# Patient Record
Sex: MALE | Employment: PART TIME | URBAN - METROPOLITAN AREA
[De-identification: names, ages, dates, MRNs, and addresses within clinical notes are randomized per-mention and may not be internally consistent; named-entity substitution may affect disease eponyms.]

---

## 2024-07-12 ENCOUNTER — APPOINTMENT (OUTPATIENT)
Dept: OTOLARYNGOLOGY | Facility: CLINIC | Age: 69
End: 2024-07-12
Payer: COMMERCIAL

## 2024-07-12 DIAGNOSIS — Z01.818 PREOPERATIVE CLEARANCE: ICD-10-CM

## 2024-07-12 DIAGNOSIS — H90.3 SENSORINEURAL HEARING LOSS (SNHL) OF BOTH EARS: Primary | ICD-10-CM

## 2024-07-12 DIAGNOSIS — H90.3 SENSORINEURAL HEARING LOSS, ASYMMETRICAL: ICD-10-CM

## 2024-07-12 PROCEDURE — 1126F AMNT PAIN NOTED NONE PRSNT: CPT | Performed by: OTOLARYNGOLOGY

## 2024-07-12 PROCEDURE — 99204 OFFICE O/P NEW MOD 45 MIN: CPT | Performed by: OTOLARYNGOLOGY

## 2024-07-12 PROCEDURE — 1159F MED LIST DOCD IN RCRD: CPT | Performed by: OTOLARYNGOLOGY

## 2024-07-12 PROCEDURE — 1160F RVW MEDS BY RX/DR IN RCRD: CPT | Performed by: OTOLARYNGOLOGY

## 2024-07-12 RX ORDER — CEFAZOLIN SODIUM 2 G/100ML
2 INJECTION, SOLUTION INTRAVENOUS ONCE
OUTPATIENT
Start: 2024-07-12 | End: 2024-07-12

## 2024-07-12 RX ORDER — SODIUM CHLORIDE 9 MG/ML
100 INJECTION, SOLUTION INTRAVENOUS CONTINUOUS
OUTPATIENT
Start: 2024-07-12

## 2024-07-12 ASSESSMENT — PATIENT HEALTH QUESTIONNAIRE - PHQ9
SUM OF ALL RESPONSES TO PHQ9 QUESTIONS 1 AND 2: 0
2. FEELING DOWN, DEPRESSED OR HOPELESS: NOT AT ALL
1. LITTLE INTEREST OR PLEASURE IN DOING THINGS: NOT AT ALL

## 2024-07-12 ASSESSMENT — PAIN SCALES - GENERAL: PAINLEVEL: 0-NO PAIN

## 2024-07-12 ASSESSMENT — COLUMBIA-SUICIDE SEVERITY RATING SCALE - C-SSRS: 1. IN THE PAST MONTH, HAVE YOU WISHED YOU WERE DEAD OR WISHED YOU COULD GO TO SLEEP AND NOT WAKE UP?: NO

## 2024-07-12 NOTE — PROGRESS NOTES
Reason for Consult:  Establish Care     Subjective   History Of Present Illness:  Giles Becerra is a 68 y.o. male with longstanding history of severe bilateral hearing loss since childhood. He was born as a premature baby and was placed in an incubator for 2 months. At around 6 years old, he was diagnosed with bilateral sensory hearing loss and was wearing hearing aids since.    For the last 10 years, he has had worsening difficulties with hearing and hearing aids. He is currently wearing BiCROS hearing aids. His right hearing declined to the point that hearing aids are no longer beneficial and his speech discrimination has significantly decreased even with the aids on.    He has a surgical history of open heart surgery.     He currently works as a .      Past Medical History:  He has no past medical history on file.    Surgical History:  He has no past surgical history on file.     Social History:  He reports that he has never smoked. He has never used smokeless tobacco. He reports that he does not drink alcohol and does not use drugs.    Family History:  family history is not on file.     Medications:  No current outpatient medications   Allergies:  Patient has no known allergies.    Review of Systems:   A comprehensive 10-point review of systems was obtained including constitutional, neurological, HEENT, pulmonary, cardiovascular, genito-urinary, and other pertinent systems and was negative except as noted in the HPI.     Objective   Physical Exam:  Last Recorded Vitals: There were no vitals taken for this visit.    On physical exam, the patient is a well-nourished, well-developed patient, in no acute distress, able to communicate without assistance in English language. Head and face is atraumatic and normocephalic. Salivary glands are intact. Facial strength is symmetrical bilaterally.       On ear examination:  Right ear: The patient has an open and patent ear canal. The tympanic  membrane is intact.    Left ear: The patient has an open and patent ear canal. The tympanic membrane is intact.    On vestibular exam, the patient has no spontaneous nystagmus, no headshake nystagmus, no head-thrust nystagmus, and no nystagmus on hyperventilation or Valsalva maneuvers. Heidy-Hallpike maneuver is negative bilaterally.       On neuro exam, the patient is alert and oriented x3, cranial nerves are grossly intact, cerebellar exam is normal.      The rest of the exam, including anterior rhinoscopy, oropharyngeal exam, neck exam, and cardiovascular exam, were normal including no palpable lymphadenopathies, thyroid in the midline position, normal pulses, and normal chest excursion.       Reviewed Results:  Audiology Testing:  I personally reviewed the CI evaluation from [facility] from  06/2024 that showed:   Right ear: 0% CNC in quiet, 0% AzBio in quiet.    Left ear: 28% CNC in quiet, 28% AzBio in quiet, 15% AzBio +10 SNR.                Imaging:  None    Procedure:  None    Assessment/Plan     1. Sensorineural hearing loss (SNHL) of both ears        In summary, Giles Becerra is a 68 y.o. male with history of open heart surgery and severe bilateral sensory hearing loss that is worse in right compared to left. Meets FDA criteria for cochlear plantation. He continues use of hearing aid in the left ear and would like to move forward only with the right.     The complications of right sided implantation was discussed with the patient and he elected to proceed. We will schedule this in the future. Prior to surgery he will need CT IAC and MRI to rule out retrocochlear pathology and immunization records.     Will follow-up in-clinic or virtually to review those records.         ____________________________________________________  Trever Velazquez MD  Professor and Chief   Otology/Neurotology/Lateral Skull-Base Surgery   [facility]     Scribe Attestation  By  signing my name below, I, Nirav Nguyen, Scribe   attest that this documentation has been prepared under the direction and in the presence of Trever Villegas MD.

## 2024-07-12 NOTE — LETTER
July 16, 2024     Cory Hoffman MD  [address] [facility] [id number]    Patient: Giles Becerra   YOB: 1955   Date of Visit: 7/12/2024       Dear Dr. Cory Hoffman MD:    Thank you for referring Giles Becerra to me for evaluation. Below are my notes for this consultation.  If you have questions, please do not hesitate to call me. I look forward to following your patient along with you.       Sincerely,     Trever Villegas MD      CC: Roslyn Bhandari, Melquiades  ______________________________________________________________________________________            Reason for Consult:  Establish Care     Subjective  History Of Present Illness:  Giles Becerra is a 68 y.o. male with longstanding history of severe bilateral hearing loss since childhood. He was born as a premature baby and was placed in an incubator for 2 months. At around 6 years old, he was diagnosed with bilateral sensory hearing loss and was wearing hearing aids since.    For the last 10 years, he has had worsening difficulties with hearing and hearing aids. He is currently wearing BiCROS hearing aids. His right hearing declined to the point that hearing aids are no longer beneficial and his speech discrimination has significantly decreased even with the aids on.    He has a surgical history of open heart surgery.     He currently works as a .      Past Medical History:  He has no past medical history on file.    Surgical History:  He has no past surgical history on file.     Social History:  He reports that he has never smoked. He has never used smokeless tobacco. He reports that he does not drink alcohol and does not use drugs.    Family History:  family history is not on file.     Medications:  No current outpatient medications   Allergies:  Patient has no known allergies.    Review of Systems:   A comprehensive 10-point review of systems was obtained including constitutional, neurological,  HEENT, pulmonary, cardiovascular, genito-urinary, and other pertinent systems and was negative except as noted in the HPI.     Objective  Physical Exam:  Last Recorded Vitals: There were no vitals taken for this visit.    On physical exam, the patient is a well-nourished, well-developed patient, in no acute distress, able to communicate without assistance in English language. Head and face is atraumatic and normocephalic. Salivary glands are intact. Facial strength is symmetrical bilaterally.       On ear examination:  Right ear: The patient has an open and patent ear canal. The tympanic membrane is intact.    Left ear: The patient has an open and patent ear canal. The tympanic membrane is intact.    On vestibular exam, the patient has no spontaneous nystagmus, no headshake nystagmus, no head-thrust nystagmus, and no nystagmus on hyperventilation or Valsalva maneuvers. Heidy-Hallpike maneuver is negative bilaterally.       On neuro exam, the patient is alert and oriented x3, cranial nerves are grossly intact, cerebellar exam is normal.      The rest of the exam, including anterior rhinoscopy, oropharyngeal exam, neck exam, and cardiovascular exam, were normal including no palpable lymphadenopathies, thyroid in the midline position, normal pulses, and normal chest excursion.       Reviewed Results:  Audiology Testing:  I personally reviewed the CI evaluation from [facility] from  06/2024 that showed:   Right ear: 0% CNC in quiet, 0% AzBio in quiet.    Left ear: 28% CNC in quiet, 28% AzBio in quiet, 15% AzBio +10 SNR.                Imaging:  None    Procedure:  None    Assessment/Plan    1. Sensorineural hearing loss (SNHL) of both ears        In summary, Giles Becerra is a 68 y.o. male with history of open heart surgery and severe bilateral sensory hearing loss that is worse in right compared to left. Meets FDA criteria for cochlear plantation. He continues use of hearing aid in the left ear and  would like to move forward only with the right.     The complications of right sided implantation was discussed with the patient and he elected to proceed. We will schedule this in the future. Prior to surgery he will need CT IAC and MRI to rule out retrocochlear pathology and immunization records.     Will follow-up in-clinic or virtually to review those records.         ____________________________________________________  Trever Velazquez MD  Professor and Chief   Otology/Neurotology/Lateral Skull-Base Surgery   [facility]     Scribe Attestation  By signing my name below, I, Nirav Nguyen, Scribe   attest that this documentation has been prepared under the direction and in the presence of Trever Villegas MD.

## 2024-07-26 ENCOUNTER — HOSPITAL ENCOUNTER (OUTPATIENT)
Dept: RADIOLOGY | Facility: CLINIC | Age: 69
Discharge: HOME | End: 2024-07-26
Payer: COMMERCIAL

## 2024-07-26 DIAGNOSIS — H90.3 SENSORINEURAL HEARING LOSS, ASYMMETRICAL: ICD-10-CM

## 2024-07-26 PROCEDURE — 70480 CT ORBIT/EAR/FOSSA W/O DYE: CPT

## 2024-07-30 ENCOUNTER — HOSPITAL ENCOUNTER (OUTPATIENT)
Dept: RADIOLOGY | Facility: CLINIC | Age: 69
Discharge: HOME | End: 2024-07-30
Payer: COMMERCIAL

## 2024-07-30 DIAGNOSIS — H90.3 SENSORINEURAL HEARING LOSS, ASYMMETRICAL: ICD-10-CM

## 2024-07-30 PROCEDURE — A9575 INJ GADOTERATE MEGLUMI 0.1ML: HCPCS | Performed by: OTOLARYNGOLOGY

## 2024-07-30 PROCEDURE — 70553 MRI BRAIN STEM W/O & W/DYE: CPT | Performed by: RADIOLOGY

## 2024-07-30 PROCEDURE — 70553 MRI BRAIN STEM W/O & W/DYE: CPT

## 2024-07-30 PROCEDURE — 2550000001 HC RX 255 CONTRASTS: Performed by: OTOLARYNGOLOGY

## 2024-07-30 RX ORDER — GADOTERATE MEGLUMINE 376.9 MG/ML
0.2 INJECTION INTRAVENOUS
Status: COMPLETED | OUTPATIENT
Start: 2024-07-30 | End: 2024-07-30

## 2024-08-02 ENCOUNTER — PATIENT MESSAGE (OUTPATIENT)
Dept: OTOLARYNGOLOGY | Facility: HOSPITAL | Age: 69
End: 2024-08-02
Payer: COMMERCIAL

## 2024-08-06 ENCOUNTER — EVALUATION (OUTPATIENT)
Dept: SPEECH THERAPY | Facility: CLINIC | Age: 69
End: 2024-08-06
Payer: COMMERCIAL

## 2024-08-06 DIAGNOSIS — H90.3 SENSORINEURAL HEARING LOSS (SNHL) OF BOTH EARS: Primary | ICD-10-CM

## 2024-08-06 DIAGNOSIS — R48.8 OTHER SYMBOLIC DYSFUNCTIONS: ICD-10-CM

## 2024-08-06 DIAGNOSIS — R41.841 COGNITIVE COMMUNICATION DEFICIT: ICD-10-CM

## 2024-08-06 DIAGNOSIS — H90.3 SENSORINEURAL HEARING LOSS, ASYMMETRICAL: ICD-10-CM

## 2024-08-06 DIAGNOSIS — Z01.818 PREOPERATIVE CLEARANCE: ICD-10-CM

## 2024-08-06 PROCEDURE — 92523 SPEECH SOUND LANG COMPREHEN: CPT | Mod: GN

## 2024-08-06 ASSESSMENT — PAIN - FUNCTIONAL ASSESSMENT: PAIN_FUNCTIONAL_ASSESSMENT: 0-10

## 2024-08-06 ASSESSMENT — PAIN SCALES - GENERAL: PAINLEVEL_OUTOF10: 0 - NO PAIN

## 2024-08-06 NOTE — PROGRESS NOTES
Speech-Language Pathology    Auditory Evaluation      Patient Name: Giles Becerra  MRN: [id number]  Today's Date: 8/6/2024     Time Calculation  Start Time: 0900  Stop Time: 1055  Time Calculation (min): 115 min      Current Problem:  Patient Active Problem List   Diagnosis    Sensorineural hearing loss (SNHL) of both ears    Other symbolic dysfunctions    Cognitive communication deficit        Recommendations and Impressions:    NORMAL OVERALL COGNITIVE ASSESSMENT    Recommendations: Aural Rehab 1-4 weeks S/P Cochlear Implant Activation    Resonance-Voice Assessment:  Assessments Used: Informal  Articulation Screening: Age appropriate  Nasal Resonance: Mild hypernasality  Nasal Air Emissions: Not present  Voice: Normal  Speech Inteligibility: Normal    Subjective   General Visit Information:  Recommendations: Aural Rehab 1-4 weeks S/P Cochlear Implant Activation  Living Environment: Home  Language at home: Spoken English, ASL   Present: No  Arrival: Independent  Reason for Referral: Cognitive assessment as part of cochlear implant candidacy determination    Provider:  Audiology: Dr. Roslyn Bhandari; [facility] in [place] (formally [facility])  ENT: Dr. Velazquez    Pain:  Pain Assessment  Pain Assessment: 0-10  0-10 (Numeric) Pain Score: 0 - No pain    Objective     Baseline Observations:  Hearing Loss: Since Birth  Current Amplification: Worn during evaluation    Recall Dr. Velazquez on 7/12/24:    Giles Becerra is a 68 y.o. male with longstanding history of severe bilateral hearing loss since childhood. He was born as a premature baby and was placed in an incubator for 2 months. At around 6 years old, he was diagnosed with bilateral sensory hearing loss and was wearing hearing aids since.     For the last 10 years, he has had worsening difficulties with hearing and hearing aids. He is currently wearing BiCROS hearing aids. His right hearing declined to the point that hearing aids are  no longer beneficial and his speech discrimination has significantly decreased even with the aids on.     He has a surgical history of open heart surgery.     He currently works as a .      Cognition Skill Assessment:  CLQT: Personal Facts Score: 8  CLQT: Personal Facts Function: WFL  CLQT: Symbol Cancellation Score: 12  CLQT: Symbol Cancellation Function: WFL  CLQT: Confrontational Naming Score: 10  CLQT: Confrontational Naming Function: WFL  CLQT: Clock Drawing Score: 13  CLQT: Clock Drawing Function: WFL  CLQT: Story Retelling Score: 9  CLQT: Story Retelling Function: WFL  CLQT: Symbol Trails Score: 10  CLQT: Symbol Trails Function: WFL  CLQT: Generative Naming Score: 6  CLQT: Generative Naming Function: WFL  CLQT: Design Memory Score: 5  CLQT: Design Memory Function: WFL  CLQT: Mazes Score: 5  CLQT: Mazes Function: Below Cutoff  CLQT: Design Generation Score: 5  CLQT: Design Generation Function: Below Cutoff  CLQT: Auditory Comprehension Score: 18  CLQT: Auditory Comprehension Function: WFL    SRCD - Severity Rating Cognitive Domains:  SRCD: Attention Score: 191  SRCD: Attention Ratin  SRCD: Attention Function: WFL  SRCD: Memory Score: 166  SRCD: Memory Ratin  SRCD: Memory Function: WFL  SRCD: Executive Functions Score: 26  SRCD: Executive Functions Ratin  SRCD: Executive Functions Function: WFL  SRCD: Language Score: 33  SRCD: Language Ratin  SRCD: Language Function: WFL  SRCD: Visuospatial Skills Score: 84  SRCD: Visuospatial Skills Ratin  SRCD:Visuospatial Skills Function: WFL  SRCD: Non-Linguistic Cognition Score: 37  SRCD: Non-Linguistic Cognition Rating: 3  SRCD: Non-Linguistic Cognition Function: Mild  SRCD: Linguistic Cognition Score: 51  SRCD: Linguistic Cognition Function: WFL  SRCD: Clock Drawing Severity Rating Score: 13  SRCD: Clock Drawing Severity Rating Rate: 4  SRCD: Clock Drawing Severity Rating Function: WFL  SRCD: Generative Naming Perseveration Ratio  Score: 0.7  SRCD: Generative Naming Perseveration Ratio Function: WFL  SRCD: Composite Severity Score Rating Function: 4  SRCD: Composite Severity Score Rating Function: WFL    Auditory Education:  Treatment Performed Today: No  Individual(s) Educated: Patient  Verbal Education Provided: Risks/Benefits of Therapy, Aural Rehabilitation s/p cochlear implant activation  Response to Educaton: Verbalized Understanding, Patient/caregiver asked appropriate questions  Patient's Learning Preference(s): Demonstration, Printed Materials, Explanation/Discussion  Patient/caregiver verbalized understanding and agreement.: Yes

## 2024-08-09 ENCOUNTER — APPOINTMENT (OUTPATIENT)
Dept: OTOLARYNGOLOGY | Facility: CLINIC | Age: 69
End: 2024-08-09
Payer: COMMERCIAL

## 2024-08-09 VITALS — WEIGHT: 217 LBS | BODY MASS INDEX: 30.38 KG/M2 | HEIGHT: 71 IN

## 2024-08-09 DIAGNOSIS — H90.3 SENSORINEURAL HEARING LOSS (SNHL) OF BOTH EARS: Primary | ICD-10-CM

## 2024-08-09 DIAGNOSIS — H93.293 IMPAIRMENT OF AUDITORY DISCRIMINATION OF BOTH EARS: ICD-10-CM

## 2024-08-09 PROCEDURE — 3008F BODY MASS INDEX DOCD: CPT | Performed by: OTOLARYNGOLOGY

## 2024-08-09 PROCEDURE — 99214 OFFICE O/P EST MOD 30 MIN: CPT | Performed by: OTOLARYNGOLOGY

## 2024-08-09 PROCEDURE — 1159F MED LIST DOCD IN RCRD: CPT | Performed by: OTOLARYNGOLOGY

## 2024-08-09 PROCEDURE — 1160F RVW MEDS BY RX/DR IN RCRD: CPT | Performed by: OTOLARYNGOLOGY

## 2024-08-09 RX ORDER — ASPIRIN 81 MG/1
81 TABLET ORAL DAILY
COMMUNITY

## 2024-08-09 RX ORDER — ATORVASTATIN CALCIUM 40 MG/1
TABLET, FILM COATED ORAL
COMMUNITY
Start: 2024-08-02

## 2024-08-09 RX ORDER — NITROGLYCERIN 0.4 MG/1
TABLET SUBLINGUAL
COMMUNITY

## 2024-08-09 RX ORDER — LEVOTHYROXINE SODIUM 112 UG/1
TABLET ORAL
COMMUNITY

## 2024-08-09 RX ORDER — CARVEDILOL 6.25 MG/1
TABLET ORAL
COMMUNITY

## 2024-08-09 RX ORDER — TAMSULOSIN HYDROCHLORIDE 0.4 MG/1
CAPSULE ORAL
COMMUNITY

## 2024-08-09 RX ORDER — BENAZEPRIL HYDROCHLORIDE 40 MG/1
TABLET ORAL
COMMUNITY

## 2024-08-09 RX ORDER — AMLODIPINE BESYLATE 10 MG/1
TABLET ORAL
COMMUNITY

## 2024-08-09 NOTE — H&P (VIEW-ONLY)
"        Reason for Consult:  Follow-up     Subjective   History Of Present Illness:  Giles Becerra is a 68 y.o. male with history of open heart surgery and severe bilateral sensory hearing loss that is worse in right compared to left. Meets FDA criteria for cochlear plantation. He continues use of hearing aid in the left ear and would like to move forward only with the right.     Since his last visit, he had a MRI showing no evidence of retrocochlear pathology. His CT showed his facial nerve in good position. He had his pneumococcal vaccine and cognitive evaluation. He was cleared by cardiology for surgery. He is here for his final visit prior to surgery.      Past Medical History:  He has a past medical history of HL (hearing loss) (1955).    Surgical History:  He has a past surgical history that includes Tonsillectomy (01/15/1965).     Social History:  He reports that he has never smoked. He has never used smokeless tobacco. He reports that he does not drink alcohol and does not use drugs.    Family History:  family history is not on file.     Medications:  Current Outpatient Medications   Medication Instructions    amLODIPine (Norvasc) 10 mg tablet     aspirin 81 mg, oral, Daily    atorvastatin (Lipitor) 40 mg tablet     benazepril (Lotensin) 40 mg tablet     carvedilol (Coreg) 6.25 mg tablet     levothyroxine (Synthroid, Levoxyl) 112 mcg tablet oral    nitroglycerin (Nitrostat) 0.4 mg SL tablet     tamsulosin (Flomax) 0.4 mg 24 hr capsule       Allergies:  Patient has no known allergies.    Review of Systems:   A comprehensive 10-point review of systems was obtained including constitutional, neurological, HEENT, pulmonary, cardiovascular, genito-urinary, and other pertinent systems and was negative except as noted in the HPI.     Objective   Physical Exam:  Last Recorded Vitals: Height 1.791 m (5' 10.5\"), weight 98.4 kg (217 lb).    On physical exam, the patient is a well-nourished, well-developed " patient, in no acute distress, able to communicate without assistance in English language. Head and face is atraumatic and normocephalic. Salivary glands are intact. Facial strength is symmetrical bilaterally.       On ear examination:  Right ear: The patient has an open and patent ear canal. The tympanic membrane is intact.    Left ear: The patient has an open and patent ear canal. The tympanic membrane is intact.    On vestibular exam, the patient has no spontaneous nystagmus, no headshake nystagmus, no head-thrust nystagmus, and no nystagmus on hyperventilation or Valsalva maneuvers. Heidy-Hallpike maneuver is negative bilaterally.       On neuro exam, the patient is alert and oriented x3, cranial nerves are grossly intact, cerebellar exam is normal.      The rest of the exam, including anterior rhinoscopy, oropharyngeal exam, neck exam, and cardiovascular exam, were normal including no palpable lymphadenopathies, thyroid in the midline position, normal pulses, and normal chest excursion.       Reviewed Results:  Audiology Testing:  I personally reviewed the CI evaluation from [facility] from  06/2024 that showed:   Right ear: 0% CNC in quiet, 0% AzBio in quiet.    Left ear: 28% CNC in quiet, 28% AzBio in quiet, 15% AzBio +10 SNR.                Imaging:  I personally reviewed his CT IAC from 07/2024:  Patent cochlea bilaterally and normal positioning of the facial nerve    I personally reviewed his MRI IAC from 07/2024:  No retrocochlear pathology.     Procedure:  None    Assessment/Plan     1. Sensorineural hearing loss (SNHL) of both ears    2. Impairment of auditory discrimination of both ears        In summary, Giles Becerra is a 68 y.o. male with history of open heart surgery and severe bilateral sensory hearing loss that is worse in right compared to left. Meets FDA criteria for cochlear plantation. He continues use of hearing aid in the left ear and would like to move forward with the  right.     He had a CT, MRI, cognitive evaluation, pneumococcal vaccine, and cardiology clearance.     The risks, indications, and complications of a right-sided cochlear implant were discussed with the patient. He is scheduled for this on 08/30/2024.      Scribe Attestation  By signing my name below, I, Nadja Cruzito , Justin attest that this documentation has been prepared under the direction and in the presence of Trever Villegas MD.    ____________________________________________________  Trever Velazquez MD  Professor and Chief   Otology/Neurotology/Lateral Skull-Base Surgery   [facility]

## 2024-08-09 NOTE — LETTER
August 10, 2024     Melquiades Phillips    Patient: Giles Becerra   YOB: 1955   Date of Visit: 8/9/2024       Dear Melquiades Avina:    Thank you for referring Giles Becerra to me for evaluation. Below are my notes for this consultation.  If you have questions, please do not hesitate to call me. I look forward to following your patient along with you.       Sincerely,     Trever Villegas MD      CC: Melquiades Guadalupe MD  ______________________________________________________________________________________            Reason for Consult:  Follow-up     Subjective  History Of Present Illness:  Giles Becerra is a 68 y.o. male with history of open heart surgery and severe bilateral sensory hearing loss that is worse in right compared to left. Meets FDA criteria for cochlear plantation. He continues use of hearing aid in the left ear and would like to move forward only with the right.     Since his last visit, he had a MRI showing no evidence of retrocochlear pathology. His CT showed his facial nerve in good position. He had his pneumococcal vaccine and cognitive evaluation. He was cleared by cardiology for surgery. He is here for his final visit prior to surgery.      Past Medical History:  He has a past medical history of HL (hearing loss) (1955).    Surgical History:  He has a past surgical history that includes Tonsillectomy (01/15/1965).     Social History:  He reports that he has never smoked. He has never used smokeless tobacco. He reports that he does not drink alcohol and does not use drugs.    Family History:  family history is not on file.     Medications:  Current Outpatient Medications   Medication Instructions   • amLODIPine (Norvasc) 10 mg tablet    • aspirin 81 mg, oral, Daily   • atorvastatin (Lipitor) 40 mg tablet    • benazepril (Lotensin) 40 mg tablet    • carvedilol (Coreg) 6.25 mg tablet    • levothyroxine (Synthroid, Levoxyl) 112 mcg  "tablet oral   • nitroglycerin (Nitrostat) 0.4 mg SL tablet    • tamsulosin (Flomax) 0.4 mg 24 hr capsule       Allergies:  Patient has no known allergies.    Review of Systems:   A comprehensive 10-point review of systems was obtained including constitutional, neurological, HEENT, pulmonary, cardiovascular, genito-urinary, and other pertinent systems and was negative except as noted in the HPI.     Objective  Physical Exam:  Last Recorded Vitals: Height 1.791 m (5' 10.5\"), weight 98.4 kg (217 lb).    On physical exam, the patient is a well-nourished, well-developed patient, in no acute distress, able to communicate without assistance in English language. Head and face is atraumatic and normocephalic. Salivary glands are intact. Facial strength is symmetrical bilaterally.       On ear examination:  Right ear: The patient has an open and patent ear canal. The tympanic membrane is intact.    Left ear: The patient has an open and patent ear canal. The tympanic membrane is intact.    On vestibular exam, the patient has no spontaneous nystagmus, no headshake nystagmus, no head-thrust nystagmus, and no nystagmus on hyperventilation or Valsalva maneuvers. Skandia-Hallpike maneuver is negative bilaterally.       On neuro exam, the patient is alert and oriented x3, cranial nerves are grossly intact, cerebellar exam is normal.      The rest of the exam, including anterior rhinoscopy, oropharyngeal exam, neck exam, and cardiovascular exam, were normal including no palpable lymphadenopathies, thyroid in the midline position, normal pulses, and normal chest excursion.       Reviewed Results:  Audiology Testing:  I personally reviewed the CI evaluation from [facility] from  06/2024 that showed:   Right ear: 0% CNC in quiet, 0% AzBio in quiet.    Left ear: 28% CNC in quiet, 28% AzBio in quiet, 15% AzBio +10 SNR.                Imaging:  I personally reviewed his CT IAC from 07/2024:  Patent cochlea bilaterally and normal " positioning of the facial nerve    I personally reviewed his MRI IAC from 07/2024:  No retrocochlear pathology.     Procedure:  None    Assessment/Plan    1. Sensorineural hearing loss (SNHL) of both ears    2. Impairment of auditory discrimination of both ears        In summary, Giles Becerra is a 68 y.o. male with history of open heart surgery and severe bilateral sensory hearing loss that is worse in right compared to left. Meets FDA criteria for cochlear plantation. He continues use of hearing aid in the left ear and would like to move forward with the right.     He had a CT, MRI, cognitive evaluation, pneumococcal vaccine, and cardiology clearance.     The risks, indications, and complications of a right-sided cochlear implant were discussed with the patient. He is scheduled for this on 08/30/2024.      Scribe Attestation  By signing my name below, INadja , Greciaibe attest that this documentation has been prepared under the direction and in the presence of Trever Villegas MD.    ____________________________________________________  Trever Velazquez MD  Professor and Chief   Otology/Neurotology/Lateral Skull-Base Surgery   [facility]

## 2024-08-09 NOTE — PROGRESS NOTES
"        Reason for Consult:  Follow-up     Subjective   History Of Present Illness:  Giles Becerra is a 68 y.o. male with history of open heart surgery and severe bilateral sensory hearing loss that is worse in right compared to left. Meets FDA criteria for cochlear plantation. He continues use of hearing aid in the left ear and would like to move forward only with the right.     Since his last visit, he had a MRI showing no evidence of retrocochlear pathology. His CT showed his facial nerve in good position. He had his pneumococcal vaccine and cognitive evaluation. He was cleared by cardiology for surgery. He is here for his final visit prior to surgery.      Past Medical History:  He has a past medical history of HL (hearing loss) (1955).    Surgical History:  He has a past surgical history that includes Tonsillectomy (01/15/1965).     Social History:  He reports that he has never smoked. He has never used smokeless tobacco. He reports that he does not drink alcohol and does not use drugs.    Family History:  family history is not on file.     Medications:  Current Outpatient Medications   Medication Instructions    amLODIPine (Norvasc) 10 mg tablet     aspirin 81 mg, oral, Daily    atorvastatin (Lipitor) 40 mg tablet     benazepril (Lotensin) 40 mg tablet     carvedilol (Coreg) 6.25 mg tablet     levothyroxine (Synthroid, Levoxyl) 112 mcg tablet oral    nitroglycerin (Nitrostat) 0.4 mg SL tablet     tamsulosin (Flomax) 0.4 mg 24 hr capsule       Allergies:  Patient has no known allergies.    Review of Systems:   A comprehensive 10-point review of systems was obtained including constitutional, neurological, HEENT, pulmonary, cardiovascular, genito-urinary, and other pertinent systems and was negative except as noted in the HPI.     Objective   Physical Exam:  Last Recorded Vitals: Height 1.791 m (5' 10.5\"), weight 98.4 kg (217 lb).    On physical exam, the patient is a well-nourished, well-developed " patient, in no acute distress, able to communicate without assistance in English language. Head and face is atraumatic and normocephalic. Salivary glands are intact. Facial strength is symmetrical bilaterally.       On ear examination:  Right ear: The patient has an open and patent ear canal. The tympanic membrane is intact.    Left ear: The patient has an open and patent ear canal. The tympanic membrane is intact.    On vestibular exam, the patient has no spontaneous nystagmus, no headshake nystagmus, no head-thrust nystagmus, and no nystagmus on hyperventilation or Valsalva maneuvers. Heidy-Hallpike maneuver is negative bilaterally.       On neuro exam, the patient is alert and oriented x3, cranial nerves are grossly intact, cerebellar exam is normal.      The rest of the exam, including anterior rhinoscopy, oropharyngeal exam, neck exam, and cardiovascular exam, were normal including no palpable lymphadenopathies, thyroid in the midline position, normal pulses, and normal chest excursion.       Reviewed Results:  Audiology Testing:  I personally reviewed the CI evaluation from [facility] from  06/2024 that showed:   Right ear: 0% CNC in quiet, 0% [employer] in quiet.    Left ear: 28% CNC in quiet, 28% Az[employer] in quiet, 15% [employer] +10 SNR.                Imaging:  I personally reviewed his CT IAC from 07/2024:  Patent cochlea bilaterally and normal positioning of the facial nerve    I personally reviewed his MRI IAC from 07/2024:  No retrocochlear pathology.     Procedure:  None    Assessment/Plan     1. Sensorineural hearing loss (SNHL) of both ears    2. Impairment of auditory discrimination of both ears        In summary, Giles Becerra is a 68 y.o. male with history of open heart surgery and severe bilateral sensory hearing loss that is worse in right compared to left. Meets FDA criteria for cochlear plantation. He continues use of hearing aid in the left ear and would like to move forward with the  right.     He had a CT, MRI, cognitive evaluation, pneumococcal vaccine, and cardiology clearance.     The risks, indications, and complications of a right-sided cochlear implant were discussed with the patient. He is scheduled for this on 08/30/2024.      Scribe Attestation  By signing my name below, I, Nadja Cruzito , Justin attest that this documentation has been prepared under the direction and in the presence of Trever Villegas MD.    ____________________________________________________  Trever Velazquez MD  Professor and Chief   Otology/Neurotology/Lateral Skull-Base Surgery   [facility]

## 2024-08-10 PROBLEM — H93.293 IMPAIRMENT OF AUDITORY DISCRIMINATION OF BOTH EARS: Status: ACTIVE | Noted: 2024-08-10

## 2024-08-14 ENCOUNTER — PATIENT MESSAGE (OUTPATIENT)
Dept: OTOLARYNGOLOGY | Facility: CLINIC | Age: 69
End: 2024-08-14
Payer: COMMERCIAL

## 2024-08-19 ENCOUNTER — PRE-ADMISSION TESTING (OUTPATIENT)
Dept: PREADMISSION TESTING | Facility: HOSPITAL | Age: 69
End: 2024-08-19
Payer: COMMERCIAL

## 2024-08-19 VITALS
HEIGHT: 70 IN | BODY MASS INDEX: 31.64 KG/M2 | RESPIRATION RATE: 18 BRPM | DIASTOLIC BLOOD PRESSURE: 86 MMHG | WEIGHT: 221 LBS | TEMPERATURE: 98.2 F | SYSTOLIC BLOOD PRESSURE: 150 MMHG | HEART RATE: 60 BPM

## 2024-08-19 DIAGNOSIS — H90.3 SENSORINEURAL HEARING LOSS, ASYMMETRICAL: Primary | ICD-10-CM

## 2024-08-19 DIAGNOSIS — Z01.818 PREOPERATIVE CLEARANCE: ICD-10-CM

## 2024-08-19 LAB
ANION GAP SERPL CALC-SCNC: 13 MMOL/L (ref 10–20)
BUN SERPL-MCNC: 13 MG/DL (ref 6–23)
CALCIUM SERPL-MCNC: 10 MG/DL (ref 8.6–10.6)
CHLORIDE SERPL-SCNC: 106 MMOL/L (ref 98–107)
CO2 SERPL-SCNC: 26 MMOL/L (ref 21–32)
CREAT SERPL-MCNC: 1.13 MG/DL (ref 0.5–1.3)
EGFRCR SERPLBLD CKD-EPI 2021: 71 ML/MIN/1.73M*2
ERYTHROCYTE [DISTWIDTH] IN BLOOD BY AUTOMATED COUNT: 13.8 % (ref 11.5–14.5)
GLUCOSE SERPL-MCNC: 93 MG/DL (ref 74–99)
HCT VFR BLD AUTO: 44.3 % (ref 41–52)
HGB BLD-MCNC: 14.9 G/DL (ref 13.5–17.5)
MCH RBC QN AUTO: 28.9 PG (ref 26–34)
MCHC RBC AUTO-ENTMCNC: 33.6 G/DL (ref 32–36)
MCV RBC AUTO: 86 FL (ref 80–100)
NRBC BLD-RTO: 0 /100 WBCS (ref 0–0)
PLATELET # BLD AUTO: 238 X10*3/UL (ref 150–450)
POTASSIUM SERPL-SCNC: 3.4 MMOL/L (ref 3.5–5.3)
RBC # BLD AUTO: 5.16 X10*6/UL (ref 4.5–5.9)
SODIUM SERPL-SCNC: 142 MMOL/L (ref 136–145)
WBC # BLD AUTO: 8 X10*3/UL (ref 4.4–11.3)

## 2024-08-19 PROCEDURE — 99204 OFFICE O/P NEW MOD 45 MIN: CPT | Performed by: NURSE PRACTITIONER

## 2024-08-19 PROCEDURE — 85027 COMPLETE CBC AUTOMATED: CPT

## 2024-08-19 PROCEDURE — 82374 ASSAY BLOOD CARBON DIOXIDE: CPT

## 2024-08-19 PROCEDURE — 87081 CULTURE SCREEN ONLY: CPT

## 2024-08-19 PROCEDURE — 36415 COLL VENOUS BLD VENIPUNCTURE: CPT

## 2024-08-19 ASSESSMENT — DUKE ACTIVITY SCORE INDEX (DASI)
CAN YOU PARTICIPATE IN MODERATE RECREATIONAL ACTIVITIES LIKE GOLF, BOWLING, DANCING, DOUBLES TENNIS OR THROWING A BASEBALL OR FOOTBALL: YES
DASI METS SCORE: 9.9
CAN YOU RUN A SHORT DISTANCE: YES
CAN YOU DO YARD WORK LIKE RAKING LEAVES, WEEDING OR PUSHING A MOWER: YES
CAN YOU WALK INDOORS, SUCH AS AROUND YOUR HOUSE: YES
CAN YOU CLIMB A FLIGHT OF STAIRS OR WALK UP A HILL: YES
TOTAL_SCORE: 58.2
CAN YOU TAKE CARE OF YOURSELF (EAT, DRESS, BATHE, OR USE TOILET): YES
CAN YOU DO MODERATE WORK AROUND THE HOUSE LIKE VACUUMING, SWEEPING FLOORS OR CARRYING GROCERIES: YES
CAN YOU DO HEAVY WORK AROUND THE HOUSE LIKE SCRUBBING FLOORS OR LIFTING AND MOVING HEAVY FURNITURE: YES
CAN YOU PARTICIPATE IN STRENOUS SPORTS LIKE SWIMMING, SINGLES TENNIS, FOOTBALL, BASKETBALL, OR SKIING: YES
CAN YOU DO LIGHT WORK AROUND THE HOUSE LIKE DUSTING OR WASHING DISHES: YES
CAN YOU WALK A BLOCK OR TWO ON LEVEL GROUND: YES
CAN YOU HAVE SEXUAL RELATIONS: YES

## 2024-08-19 ASSESSMENT — ENCOUNTER SYMPTOMS
ENDOCRINE NEGATIVE: 1
EYES NEGATIVE: 1
CARDIOVASCULAR NEGATIVE: 1
GASTROINTESTINAL NEGATIVE: 1
NEUROLOGICAL NEGATIVE: 1
RESPIRATORY NEGATIVE: 1
CONSTITUTIONAL NEGATIVE: 1
MUSCULOSKELETAL NEGATIVE: 1
NECK NEGATIVE: 1

## 2024-08-19 ASSESSMENT — LIFESTYLE VARIABLES: SMOKING_STATUS: NONSMOKER

## 2024-08-19 NOTE — CPM/PAT H&P
CPM/PAT Evaluation       Name: Giles Beecrra (Giles Becerra)  /Age: 1955/68 y.o.     Visit Type:   In-Person       Chief Complaint: Sensorineural hearing loss (SNHL) of both ears     HPI  Pt is a 68 year old male with a PMHx significant for COPD, BPH, BENJA, diastolic heart failure, bilateral sensorineural hearing loss and CAD s/p CABG.  Patient is being evaluated in CPM in anticipation of insertion of Right Cochlear Implant with Dr. Marcus Villegas on 24.  Past Medical History:   Diagnosis Date    Arthritis     Coronary artery disease     HL (hearing loss) 1955    Hyperlipidemia     Hypertension     Hypothyroidism        Past Surgical History:   Procedure Laterality Date    CORONARY ARTERY BYPASS GRAFT      FINGER SURGERY      TONSILLECTOMY  01/15/1965       Patient  reports being sexually active and has had partner(s) who are female. He reports using the following method of birth control/protection: Condom Male.    Family History   Family history unknown: Yes       No Known Allergies    Prior to Admission medications    Medication Sig Start Date End Date Taking? Authorizing Provider   amLODIPine (Norvasc) 10 mg tablet    Yes Historical Provider, MD   aspirin 81 mg EC tablet Take 1 tablet (81 mg) by mouth once daily.   Yes Historical Provider, MD   atorvastatin (Lipitor) 40 mg tablet  24  Yes Historical Provider, MD   benazepril (Lotensin) 40 mg tablet    Yes Historical Provider, MD   carvedilol (Coreg) 6.25 mg tablet    Yes Historical Provider, MD   levothyroxine (Synthroid, Levoxyl) 112 mcg tablet Take by mouth.   Yes Historical Provider, MD   nitroglycerin (Nitrostat) 0.4 mg SL tablet    Yes Historical Provider, MD   tamsulosin (Flomax) 0.4 mg 24 hr capsule    Yes Historical Provider, MD DAN ROS:   Constitutional:   neg    Neuro/Psych:   neg    Eyes:   neg    Ears:    hearing loss   hearing aides  Nose:   neg    Mouth:   neg    Throat:   neg    Neck:   neg    Cardio:   neg     Respiratory:   neg    Endocrine:   neg    GI:   neg    :   neg    Musculoskeletal:   neg    Hematologic:   neg    Skin:  neg        Physical Exam  Vitals reviewed.   Constitutional:       Appearance: Normal appearance.   HENT:      Head: Normocephalic and atraumatic.   Cardiovascular:      Rate and Rhythm: Normal rate and regular rhythm.      Pulses: Normal pulses.      Heart sounds: Normal heart sounds.   Pulmonary:      Effort: Pulmonary effort is normal.      Breath sounds: Normal breath sounds.   Abdominal:      Palpations: Abdomen is soft.   Musculoskeletal:         General: Normal range of motion.      Cervical back: Normal range of motion.   Skin:     General: Skin is warm.   Neurological:      General: No focal deficit present.      Mental Status: He is alert and oriented to person, place, and time.   Psychiatric:         Mood and Affect: Mood normal.         Behavior: Behavior normal.          PAT AIRWAY:   Airway:     Mallampati::  II    TM distance::  >3 FB    Neck ROM::  Full  normal        Visit Vitals  /86   Pulse 60   Temp 36.8 °C (98.2 °F)   Resp 18       DASI Risk Score      Flowsheet Row Most Recent Value   DASI SCORE 58.2   METS Score (Will be calculated only when all the questions are answered) 9.9          Caprini DVT Assessment      Flowsheet Row Most Recent Value   DVT Score 9   Current Status Major surgery planned, lasting 2-3 hours   History Prior major surgery   Age 60-75 years   BMI 31-40 (Obesity)          Modified Frailty Index      Flowsheet Row Most Recent Value   Modified Frailty Index Calculator .1818          CHADS2 Stroke Risk  Current as of a minute ago        N/A 3 to 100%: High Risk   2 to < 3%: Medium Risk   0 to < 2%: Low Risk     Last Change: N/A          This score determines the patient's risk of having a stroke if the patient has atrial fibrillation.        This score is not applicable to this patient. Components are not calculated.          Revised Cardiac Risk  Index    No data to display       Apfel Simplified Score      Flowsheet Row Most Recent Value   Apfel Simplified Score Calculator 2          Risk Analysis Index Results This Encounter    No data found in the last 1 encounters.       Stop Bang Score      Flowsheet Row Most Recent Value   Do you snore loudly? 1   Do you often feel tired or fatigued after your sleep? 0   Has anyone ever observed you stop breathing in your sleep? 1   Do you have or are you being treated for high blood pressure? 1   Recent BMI (Calculated) 31.7   Is BMI greater than 35 kg/m2? 0=No   Age older than 50 years old? 1=Yes   Is your neck circumference greater than 17 inches (Male) or 16 inches (Female)? 0   Gender - Male 1=Yes   STOP-BANG Total Score 5            Assessment and Plan:     Anesthesia:  The patient denies problems with anesthesia in the past such as PONV, prolonged sedation, awareness, dental damage, aspiration, cardiac arrest, difficult intubation, or unexpected hospital admissions.     Neuro:   The patient has no neurological diagnoses or significant findings on chart review, clinical presentation, and evaluation. No grossly apparent neurological perioperative risk. The patient is at increased risk for postoperative delirium secondary to age 65 or older, sensory Impairment. The patient is at increased risk for perioperative stroke secondary to hypertension , increased age, hyperlipidemia, general anesthesia.    HEENT/Airway  No diagnoses, significant findings on chart review, clinical presentation, or evaluation.    Cardiovascular  The patient is scheduled for non-cardiac surgery associated with elevated risk. The patient has no major cardiac contraindications to non- cardiac surgery.  RCRI  The patient meets 0-1 RCRI criteria and therefore has a less than 1% risk of major adverse cardiac complications.  METS  The patient's functional capacity capacity is greater than 4 METS.  EKG  The patient has no EKG or echocardiographic  changes concerning for myocardial ischemia.   Heart Failure  The patient has a known history of heart failure, which is currently compensated, due to diastolic dysfunction  Hypertension Evaluation  The patient has a known history of hypertension that is controlled.  Patient's hypertension is most consistent with stage 1.  Heart Rhythm Evaluation  The patient has no history of arrhythmias.  Heart Valve Evaluation  The patient has no known history of valvular heart disease. The patient has no symptoms or physical exam findings to suggest valvular heart disease.  Cardiology Evaluation  The patient follows with cardiology, Dr. Ritter. Patient was last seen 8-5-24. Per note, pt ok to proceed with cochlear implant surgery.    The patient has a 30-day risk for MACE of 1 predictor, 6.0% risk for cardiac death, nonfatal myocardial infarction, and nonfatal cardiac arrest.  DAVIDSON score which indicates a 0.2% risk of intraoperative or 30-day postoperative MACE (major adverse cardiac event).    Pulmonary   No significant findings on chart review or clinical presentation and evaluation. The patient is at increased risk of perioperative pulmonary complications secondary to advanced age greater than 60.    The patient has a stop bang score of 5, which places patient at high risk for having BENJA.    ARISCAT 19, low, 1.6% risk of in-hospital postoperative pulmonary complications  PRODIGY 28, high risk of respiratory depression episode. Patient given PI sheet for preoperative deep breathing exercises.    Hematology  No diagnoses or significant findings on chart review or clinical presentation and evaluation.  Antiplatelet management   The patient is currently receiving antiplatelet therapy for CAD.  Anticoagulation management  The patient is not currently receiving anticoagulation therapy.    Caprini score 9, high risk of perioperative VTE.     Patient instructed to ambulate as soon as possible postoperatively to decrease  thromboembolic risk. Initiate mechanical DVT prophylaxis as soon as possible and initiate chemical prophylaxis when deemed safe from a bleeding standpoint post surgery.     Transfusion Evaluation  T&S not obtained. Low likelihood for perioperative transfusion of blood or blood products.    Gastrointestinal  No diagnoses or significant findings on chart review or clinical presentation and evaluation.    Eat 10- 0,  self-perceived oropharyngeal dysphagia scale (0-40)     Genitourinary  No diagnoses or significant findings on chart review or clinical presentation and evaluation.    Renal  No renal diagnoses or significant findings on chart review or clinical presentation and evaluation. The patient has specific risk factors associated with increased risk of perioperative renal complications related to age greater than 55, male gender, hypertension.    Musculoskeletal  The patient has diagnoses or significant findings on chart review or clinical presentation and evaluation significant for osteoarthritis    Endocrine  Diabetes Evaluation  The patient has no history of diabetes mellitus.  Thyroid Disease Evaluation  The patient has no history of thyroid disease.    ID  No diagnoses or significant findings on chart review or clinical presentation and evaluation.    -Preoperative medication instructions were provided and reviewed with the patient.  Any additional testing or evaluation was explained to the patient.  NPO Instructions were discussed, and the patient's questions were answered prior to conclusion of this encounter. Patient verbalized understanding of preoperative instructions. After Visit Summary given.      Recent Results (from the past 168 hour(s))   CBC    Collection Time: 08/19/24  2:49 PM   Result Value Ref Range    WBC 8.0 4.4 - 11.3 x10*3/uL    nRBC 0.0 0.0 - 0.0 /100 WBCs    RBC 5.16 4.50 - 5.90 x10*6/uL    Hemoglobin 14.9 13.5 - 17.5 g/dL    Hematocrit 44.3 41.0 - 52.0 %    MCV 86 80 - 100 fL    MCH  28.9 26.0 - 34.0 pg    MCHC 33.6 32.0 - 36.0 g/dL    RDW 13.8 11.5 - 14.5 %    Platelets 238 150 - 450 x10*3/uL   Basic Metabolic Panel    Collection Time: 08/19/24  2:49 PM   Result Value Ref Range    Glucose 93 74 - 99 mg/dL    Sodium 142 136 - 145 mmol/L    Potassium 3.4 (L) 3.5 - 5.3 mmol/L    Chloride 106 98 - 107 mmol/L    Bicarbonate 26 21 - 32 mmol/L    Anion Gap 13 10 - 20 mmol/L    Urea Nitrogen 13 6 - 23 mg/dL    Creatinine 1.13 0.50 - 1.30 mg/dL    eGFR 71 >60 mL/min/1.73m*2    Calcium 10.0 8.6 - 10.6 mg/dL   Staphylococcus aureus/MRSA colonization, Culture    Collection Time: 08/19/24  2:58 PM    Specimen: Nares/Axilla/Groin; Swab   Result Value Ref Range    Staph/MRSA Screen Culture (A)      Isolated: Methicillin Susceptible Staphylococcus aureus (MSSA)

## 2024-08-19 NOTE — PREPROCEDURE INSTRUCTIONS
Fasting Guidelines    NPO Instructions:    Do not eat any food after midnight the night before your surgery/procedure.  You may have up to TEN ounces of clear liquids until TWO hours before your instructed arrival time to the hospital. This includes water, black tea/coffee, (no milk or cream), apple juice, and/or electrolyte drinks (Gatorade).  You may chew gum up to TWO hours before your surgery/procedure.    Additional Instructions:    Avoid herbal supplements, multivitamins and NSAIDS (non-steroidal anti-inflammatory drugs) such as Advil, Aleve, Ibuprofen, Naproxen, Excedrin, Meloxicam or Celebrex for at least 7 days prior to surgery. May take Tylenol as needed.    Avoid tobacco and alcohol products for 24 hours prior to surgery.    CONTACT SURGEON'S OFFICE IF YOU DEVELOP:  * Fever = 100.4 F   * New respiratory symptoms (e.g. cough, shortness of breath, respiratory distress, sore throat)  * Recent loss of taste or smell  *Flu like symptoms such as headache, fatigue or gastrointestinal symptoms  * You develop any open sores, shingles, burning or painful urination   AND/OR:  * You no longer wish to have the surgery.  * Any other personal circumstances change that may lead to the need to cancel or defer this surgery.  *You were admitted to any hospital within one week of your planned procedure.    Seven/Six Days before Surgery:  Review your medication instructions, stop indicated medications    Day of Surgery:  Review your medication instructions, take indicated medications  Wear comfortable loose fitting clothing  Do not use moisturizers, creams, lotions or perfume  All jewelry and valuables should be left at home        [facility]  [phone]       Preoperative Brain Exercises    What are brain exercises?  A brain exercise is any activity that engages your thinking (cognitive) skills.    What types of activities are considered brain exercises?  Jigsaw puzzles, crossword puzzles,  word jumble, memory games, word search, and many more.  Many can be found free online or on your phone via a mobile lea.    Why should I do brain exercises before my surgery?  More recent research has shown brain exercise before surgery can lower the risk of postoperative delirium (confusion) which can be especially important for older adults.  Patients who did brain exercises for 5 to 10 hours the days before surgery, cut their risk of postoperative delirium in half up to 1 week after surgery.         [facility]    Preoperative Deep Breathing Exercises    Why it is important to do deep breathing exercises before my surgery?  Deep breathing exercises strengthen your breathing muscles.  This helps you to recover after your surgery and decreases the chance of breathing complications.      How are the deep breathing exercises done?  Sit straight with your back supported.  Breathe in deeply and slowly through your nose. Your lower rib cage should expand and your abdomen may move forward.  Hold that breath for 3 to 5 seconds.  Breathe out through pursed lips, slowly and completely.  Rest and repeat 10 times every hour while awake.  Rest longer if you become dizzy or lightheaded.         Patient and Family Education             Ways You Can Help Prevent Blood Clots             This handout explains some simple things you can do to help prevent blood clots.      Blood clots are blockages that can form in the body's veins. When a blood clot forms in your deep veins, it may be called a deep vein thrombosis, or DVT for short. Blood clots can happen in any part of the body where blood flows, but they are most common in the arms and legs. If a piece of a blood clot breaks free and travels to the lungs, it is called a pulmonary embolus (PE). A PE can be a very serious problem.         Being in the hospital or having surgery can raise your chances of getting a blood clot because you may not be well  enough to move around as much as you normally do.         Ways you can help prevent blood clots in the hospital         Wearing SCDs. SCDs stands for Sequential Compression Devices.   SCDs are special sleeves that wrap around your legs  They attach to a pump that fills them with air to gently squeeze your legs every few minutes.   This helps return the blood in your legs to your heart.   SCDs should only be taken off when walking or bathing.   SCDs may not be comfortable, but they can help save your life.               Wearing compression stockings - if your doctor orders them. These special snug fitting stockings gently squeeze your legs to help blood flow.       Walking. Walking helps move the blood in your legs.   If your doctor says it is ok, try walking the halls at least   5 times a day. Ask us to help you get up, so you don't fall.      Taking any blood thinning medicines your doctor orders.        Page 1 of 2     [facility]; 3/23   Ways you can help prevent blood clots at home       Wearing compression stockings - if your doctor orders them. ? Walking - to help move the blood in your legs.       Taking any blood thinning medicines your doctor orders.      Signs of a blood clot or PE      Tell your doctor or nurse know right away if you have of the problems listed below.    If you are at home, seek medical care right away. Call 911 for chest pain or problems breathing.               Signs of a blood clot (DVT) - such as pain,  swelling, redness or warmth in your arm or leg      Signs of a pulmonary embolism (PE) - such as chest     pain or feeling short of breath

## 2024-08-21 DIAGNOSIS — Z22.322 MRSA (METHICILLIN RESISTANT STAPH AUREUS) CULTURE POSITIVE: ICD-10-CM

## 2024-08-21 LAB — STAPHYLOCOCCUS SPEC CULT: ABNORMAL

## 2024-08-21 RX ORDER — MUPIROCIN 20 MG/G
OINTMENT TOPICAL
Qty: 15 G | Refills: 0 | Status: SHIPPED | OUTPATIENT
Start: 2024-08-21

## 2024-08-28 ENCOUNTER — PATIENT MESSAGE (OUTPATIENT)
Dept: OTOLARYNGOLOGY | Facility: HOSPITAL | Age: 69
End: 2024-08-28
Payer: COMMERCIAL

## 2024-08-30 ENCOUNTER — ANESTHESIA (OUTPATIENT)
Dept: OPERATING ROOM | Facility: HOSPITAL | Age: 69
End: 2024-08-30
Payer: COMMERCIAL

## 2024-08-30 ENCOUNTER — HOSPITAL ENCOUNTER (OUTPATIENT)
Facility: HOSPITAL | Age: 69
Setting detail: OUTPATIENT SURGERY
Discharge: HOME | End: 2024-08-30
Attending: OTOLARYNGOLOGY | Admitting: OTOLARYNGOLOGY
Payer: COMMERCIAL

## 2024-08-30 ENCOUNTER — ANESTHESIA EVENT (OUTPATIENT)
Dept: OPERATING ROOM | Facility: HOSPITAL | Age: 69
End: 2024-08-30
Payer: COMMERCIAL

## 2024-08-30 VITALS
WEIGHT: 218.03 LBS | RESPIRATION RATE: 18 BRPM | DIASTOLIC BLOOD PRESSURE: 90 MMHG | TEMPERATURE: 97 F | OXYGEN SATURATION: 93 % | HEART RATE: 59 BPM | BODY MASS INDEX: 31.28 KG/M2 | SYSTOLIC BLOOD PRESSURE: 145 MMHG

## 2024-08-30 DIAGNOSIS — H90.3 SENSORINEURAL HEARING LOSS (SNHL) OF BOTH EARS: Primary | ICD-10-CM

## 2024-08-30 PROBLEM — E78.5 HYPERLIPIDEMIA: Status: ACTIVE | Noted: 2024-08-30

## 2024-08-30 PROBLEM — I25.10 CAD (CORONARY ARTERY DISEASE): Status: ACTIVE | Noted: 2024-08-30

## 2024-08-30 PROBLEM — N40.0 BPH (BENIGN PROSTATIC HYPERPLASIA): Status: ACTIVE | Noted: 2024-08-30

## 2024-08-30 PROBLEM — Z95.1 HISTORY OF CORONARY ARTERY BYPASS GRAFT: Status: ACTIVE | Noted: 2024-08-30

## 2024-08-30 PROBLEM — E03.9 HYPOTHYROIDISM: Status: ACTIVE | Noted: 2024-08-30

## 2024-08-30 PROBLEM — I10 HTN (HYPERTENSION): Status: ACTIVE | Noted: 2024-08-30

## 2024-08-30 PROCEDURE — 3600000004 HC OR TIME - INITIAL BASE CHARGE - PROCEDURE LEVEL FOUR: Performed by: OTOLARYNGOLOGY

## 2024-08-30 PROCEDURE — 3600000009 HC OR TIME - EACH INCREMENTAL 1 MINUTE - PROCEDURE LEVEL FOUR: Performed by: OTOLARYNGOLOGY

## 2024-08-30 PROCEDURE — 2500000005 HC RX 250 GENERAL PHARMACY W/O HCPCS: Performed by: OTOLARYNGOLOGY

## 2024-08-30 PROCEDURE — 2500000004 HC RX 250 GENERAL PHARMACY W/ HCPCS (ALT 636 FOR OP/ED): Mod: JZ | Performed by: OTOLARYNGOLOGY

## 2024-08-30 PROCEDURE — P9045 ALBUMIN (HUMAN), 5%, 250 ML: HCPCS | Mod: JZ

## 2024-08-30 PROCEDURE — 3700000002 HC GENERAL ANESTHESIA TIME - EACH INCREMENTAL 1 MINUTE: Performed by: OTOLARYNGOLOGY

## 2024-08-30 PROCEDURE — 2500000004 HC RX 250 GENERAL PHARMACY W/ HCPCS (ALT 636 FOR OP/ED): Performed by: OTOLARYNGOLOGY

## 2024-08-30 PROCEDURE — L8614 COCHLEAR DEVICE: HCPCS | Performed by: OTOLARYNGOLOGY

## 2024-08-30 PROCEDURE — 2500000005 HC RX 250 GENERAL PHARMACY W/O HCPCS

## 2024-08-30 PROCEDURE — 2500000004 HC RX 250 GENERAL PHARMACY W/ HCPCS (ALT 636 FOR OP/ED): Performed by: STUDENT IN AN ORGANIZED HEALTH CARE EDUCATION/TRAINING PROGRAM

## 2024-08-30 PROCEDURE — 2500000004 HC RX 250 GENERAL PHARMACY W/ HCPCS (ALT 636 FOR OP/ED)

## 2024-08-30 PROCEDURE — 3700000001 HC GENERAL ANESTHESIA TIME - INITIAL BASE CHARGE: Performed by: OTOLARYNGOLOGY

## 2024-08-30 PROCEDURE — 2720000007 HC OR 272 NO HCPCS: Performed by: OTOLARYNGOLOGY

## 2024-08-30 PROCEDURE — 7100000009 HC PHASE TWO TIME - INITIAL BASE CHARGE: Performed by: OTOLARYNGOLOGY

## 2024-08-30 PROCEDURE — 69930 IMPLANT COCHLEAR DEVICE: CPT | Performed by: OTOLARYNGOLOGY

## 2024-08-30 PROCEDURE — 2500000002 HC RX 250 W HCPCS SELF ADMINISTERED DRUGS (ALT 637 FOR MEDICARE OP, ALT 636 FOR OP/ED): Performed by: OTOLARYNGOLOGY

## 2024-08-30 PROCEDURE — 7100000010 HC PHASE TWO TIME - EACH INCREMENTAL 1 MINUTE: Performed by: OTOLARYNGOLOGY

## 2024-08-30 PROCEDURE — 95867 NDL EMG CRANIAL NRV MUSC UNI: CPT | Performed by: OTOLARYNGOLOGY

## 2024-08-30 PROCEDURE — 2780000003 HC OR 278 NO HCPCS: Performed by: OTOLARYNGOLOGY

## 2024-08-30 PROCEDURE — 2500000001 HC RX 250 WO HCPCS SELF ADMINISTERED DRUGS (ALT 637 FOR MEDICARE OP): Performed by: STUDENT IN AN ORGANIZED HEALTH CARE EDUCATION/TRAINING PROGRAM

## 2024-08-30 PROCEDURE — 2500000001 HC RX 250 WO HCPCS SELF ADMINISTERED DRUGS (ALT 637 FOR MEDICARE OP): Performed by: OTOLARYNGOLOGY

## 2024-08-30 PROCEDURE — 7100000002 HC RECOVERY ROOM TIME - EACH INCREMENTAL 1 MINUTE: Performed by: OTOLARYNGOLOGY

## 2024-08-30 PROCEDURE — 7100000001 HC RECOVERY ROOM TIME - INITIAL BASE CHARGE: Performed by: OTOLARYNGOLOGY

## 2024-08-30 DEVICE — COCHLEAR, NUCLEUS CI632, PROFILE PLUS W/SLIM MODIOLAR ELECTRODE: Type: IMPLANTABLE DEVICE | Site: COCHLEA | Status: FUNCTIONAL

## 2024-08-30 RX ORDER — HYDROMORPHONE HYDROCHLORIDE 1 MG/ML
0.1 INJECTION, SOLUTION INTRAMUSCULAR; INTRAVENOUS; SUBCUTANEOUS EVERY 5 MIN PRN
Status: CANCELLED | OUTPATIENT
Start: 2024-08-30

## 2024-08-30 RX ORDER — ACETAMINOPHEN 325 MG/1
650 TABLET ORAL EVERY 6 HOURS PRN
Start: 2024-08-30

## 2024-08-30 RX ORDER — OXYCODONE HYDROCHLORIDE 5 MG/1
5 TABLET ORAL EVERY 4 HOURS PRN
Status: DISCONTINUED | OUTPATIENT
Start: 2024-08-30 | End: 2024-08-30 | Stop reason: HOSPADM

## 2024-08-30 RX ORDER — PHENYLEPHRINE HCL IN 0.9% NACL 1 MG/10 ML
SYRINGE (ML) INTRAVENOUS AS NEEDED
Status: DISCONTINUED | OUTPATIENT
Start: 2024-08-30 | End: 2024-08-30

## 2024-08-30 RX ORDER — LIDOCAINE HYDROCHLORIDE 10 MG/ML
0.1 INJECTION INFILTRATION; PERINEURAL ONCE
Status: CANCELLED | OUTPATIENT
Start: 2024-08-30 | End: 2024-08-30

## 2024-08-30 RX ORDER — ALBUTEROL SULFATE 0.83 MG/ML
2.5 SOLUTION RESPIRATORY (INHALATION) ONCE AS NEEDED
Status: DISCONTINUED | OUTPATIENT
Start: 2024-08-30 | End: 2024-08-30 | Stop reason: HOSPADM

## 2024-08-30 RX ORDER — ONDANSETRON HYDROCHLORIDE 2 MG/ML
4 INJECTION, SOLUTION INTRAVENOUS ONCE AS NEEDED
Status: CANCELLED | OUTPATIENT
Start: 2024-08-30

## 2024-08-30 RX ORDER — HYDROMORPHONE HYDROCHLORIDE 1 MG/ML
0.5 INJECTION, SOLUTION INTRAMUSCULAR; INTRAVENOUS; SUBCUTANEOUS EVERY 5 MIN PRN
Status: CANCELLED | OUTPATIENT
Start: 2024-08-30

## 2024-08-30 RX ORDER — SODIUM CHLORIDE, SODIUM LACTATE, POTASSIUM CHLORIDE, CALCIUM CHLORIDE 600; 310; 30; 20 MG/100ML; MG/100ML; MG/100ML; MG/100ML
100 INJECTION, SOLUTION INTRAVENOUS CONTINUOUS
Status: DISCONTINUED | OUTPATIENT
Start: 2024-08-30 | End: 2024-08-30 | Stop reason: HOSPADM

## 2024-08-30 RX ORDER — CEPHALEXIN 500 MG/1
500 CAPSULE ORAL 3 TIMES DAILY
Qty: 21 CAPSULE | Refills: 0 | Status: SHIPPED | OUTPATIENT
Start: 2024-08-30 | End: 2024-09-06

## 2024-08-30 RX ORDER — LIDOCAINE HYDROCHLORIDE 10 MG/ML
0.1 INJECTION INFILTRATION; PERINEURAL ONCE
Status: DISCONTINUED | OUTPATIENT
Start: 2024-08-30 | End: 2024-08-30 | Stop reason: HOSPADM

## 2024-08-30 RX ORDER — MIDAZOLAM HYDROCHLORIDE 1 MG/ML
INJECTION, SOLUTION INTRAMUSCULAR; INTRAVENOUS AS NEEDED
Status: DISCONTINUED | OUTPATIENT
Start: 2024-08-30 | End: 2024-08-30

## 2024-08-30 RX ORDER — HYDROMORPHONE HYDROCHLORIDE 1 MG/ML
0.5 INJECTION, SOLUTION INTRAMUSCULAR; INTRAVENOUS; SUBCUTANEOUS EVERY 5 MIN PRN
Status: DISCONTINUED | OUTPATIENT
Start: 2024-08-30 | End: 2024-08-30 | Stop reason: HOSPADM

## 2024-08-30 RX ORDER — DROPERIDOL 2.5 MG/ML
0.62 INJECTION, SOLUTION INTRAMUSCULAR; INTRAVENOUS ONCE AS NEEDED
Status: CANCELLED | OUTPATIENT
Start: 2024-08-30

## 2024-08-30 RX ORDER — IBUPROFEN 600 MG/1
600 TABLET ORAL EVERY 6 HOURS PRN
Start: 2024-08-30

## 2024-08-30 RX ORDER — PHENYLEPHRINE 10 MG/250 ML(40 MCG/ML)IN 0.9 % SOD.CHLORIDE INTRAVENOUS
CONTINUOUS PRN
Status: DISCONTINUED | OUTPATIENT
Start: 2024-08-30 | End: 2024-08-30

## 2024-08-30 RX ORDER — ASPIRIN 81 MG
100 TABLET, DELAYED RELEASE (ENTERIC COATED) ORAL 2 TIMES DAILY
Qty: 20 TABLET | Refills: 0 | Status: SHIPPED | OUTPATIENT
Start: 2024-08-30 | End: 2024-09-09

## 2024-08-30 RX ORDER — EPINEPHRINE 1 MG/ML
INJECTION, SOLUTION, CONCENTRATE INTRAVENOUS AS NEEDED
Status: DISCONTINUED | OUTPATIENT
Start: 2024-08-30 | End: 2024-08-30 | Stop reason: HOSPADM

## 2024-08-30 RX ORDER — ONDANSETRON HYDROCHLORIDE 2 MG/ML
INJECTION, SOLUTION INTRAVENOUS AS NEEDED
Status: DISCONTINUED | OUTPATIENT
Start: 2024-08-30 | End: 2024-08-30

## 2024-08-30 RX ORDER — SODIUM CHLORIDE 9 MG/ML
100 INJECTION, SOLUTION INTRAVENOUS CONTINUOUS
Status: DISCONTINUED | OUTPATIENT
Start: 2024-08-30 | End: 2024-08-30 | Stop reason: HOSPADM

## 2024-08-30 RX ORDER — LIDOCAINE HYDROCHLORIDE 20 MG/ML
INJECTION, SOLUTION INFILTRATION; PERINEURAL AS NEEDED
Status: DISCONTINUED | OUTPATIENT
Start: 2024-08-30 | End: 2024-08-30

## 2024-08-30 RX ORDER — MUPIROCIN 20 MG/G
OINTMENT TOPICAL AS NEEDED
Status: DISCONTINUED | OUTPATIENT
Start: 2024-08-30 | End: 2024-08-30 | Stop reason: HOSPADM

## 2024-08-30 RX ORDER — TRAMADOL HYDROCHLORIDE 50 MG/1
50 TABLET ORAL EVERY 4 HOURS PRN
Qty: 12 TABLET | Refills: 0 | Status: SHIPPED | OUTPATIENT
Start: 2024-08-30

## 2024-08-30 RX ORDER — MEPERIDINE HYDROCHLORIDE 25 MG/ML
12.5 INJECTION INTRAMUSCULAR; INTRAVENOUS; SUBCUTANEOUS EVERY 10 MIN PRN
Status: CANCELLED | OUTPATIENT
Start: 2024-08-30

## 2024-08-30 RX ORDER — LABETALOL HYDROCHLORIDE 5 MG/ML
5 INJECTION, SOLUTION INTRAVENOUS ONCE AS NEEDED
Status: DISCONTINUED | OUTPATIENT
Start: 2024-08-30 | End: 2024-08-30 | Stop reason: HOSPADM

## 2024-08-30 RX ORDER — FENTANYL CITRATE 50 UG/ML
INJECTION, SOLUTION INTRAMUSCULAR; INTRAVENOUS AS NEEDED
Status: DISCONTINUED | OUTPATIENT
Start: 2024-08-30 | End: 2024-08-30

## 2024-08-30 RX ORDER — ONDANSETRON HYDROCHLORIDE 2 MG/ML
4 INJECTION, SOLUTION INTRAVENOUS ONCE AS NEEDED
Status: DISCONTINUED | OUTPATIENT
Start: 2024-08-30 | End: 2024-08-30 | Stop reason: HOSPADM

## 2024-08-30 RX ORDER — HYDROMORPHONE HYDROCHLORIDE 1 MG/ML
0.2 INJECTION, SOLUTION INTRAMUSCULAR; INTRAVENOUS; SUBCUTANEOUS EVERY 5 MIN PRN
Status: CANCELLED | OUTPATIENT
Start: 2024-08-30

## 2024-08-30 RX ORDER — SODIUM CHLORIDE 0.9 G/100ML
IRRIGANT IRRIGATION AS NEEDED
Status: DISCONTINUED | OUTPATIENT
Start: 2024-08-30 | End: 2024-08-30 | Stop reason: HOSPADM

## 2024-08-30 RX ORDER — ROCURONIUM BROMIDE 10 MG/ML
INJECTION, SOLUTION INTRAVENOUS AS NEEDED
Status: DISCONTINUED | OUTPATIENT
Start: 2024-08-30 | End: 2024-08-30

## 2024-08-30 RX ORDER — REMIFENTANIL HYDROCHLORIDE 1 MG/ML
INJECTION, POWDER, LYOPHILIZED, FOR SOLUTION INTRAVENOUS AS NEEDED
Status: DISCONTINUED | OUTPATIENT
Start: 2024-08-30 | End: 2024-08-30

## 2024-08-30 RX ORDER — HYDRALAZINE HYDROCHLORIDE 20 MG/ML
5 INJECTION INTRAMUSCULAR; INTRAVENOUS EVERY 30 MIN PRN
Status: DISCONTINUED | OUTPATIENT
Start: 2024-08-30 | End: 2024-08-30 | Stop reason: HOSPADM

## 2024-08-30 RX ORDER — HYDROMORPHONE HYDROCHLORIDE 1 MG/ML
INJECTION, SOLUTION INTRAMUSCULAR; INTRAVENOUS; SUBCUTANEOUS CONTINUOUS PRN
Status: DISCONTINUED | OUTPATIENT
Start: 2024-08-30 | End: 2024-08-30

## 2024-08-30 RX ORDER — CIPROFLOXACIN AND DEXAMETHASONE 3; 1 MG/ML; MG/ML
SUSPENSION/ DROPS AURICULAR (OTIC) AS NEEDED
Status: DISCONTINUED | OUTPATIENT
Start: 2024-08-30 | End: 2024-08-30 | Stop reason: HOSPADM

## 2024-08-30 RX ORDER — ONDANSETRON 4 MG/1
4 TABLET, FILM COATED ORAL EVERY 8 HOURS PRN
Qty: 30 TABLET | Refills: 0 | Status: SHIPPED | OUTPATIENT
Start: 2024-08-30 | End: 2024-09-29

## 2024-08-30 RX ORDER — LABETALOL HYDROCHLORIDE 5 MG/ML
5 INJECTION, SOLUTION INTRAVENOUS ONCE AS NEEDED
Status: CANCELLED | OUTPATIENT
Start: 2024-08-30

## 2024-08-30 RX ORDER — LIDOCAINE HYDROCHLORIDE AND EPINEPHRINE 10; 10 MG/ML; UG/ML
INJECTION, SOLUTION INFILTRATION; PERINEURAL AS NEEDED
Status: DISCONTINUED | OUTPATIENT
Start: 2024-08-30 | End: 2024-08-30 | Stop reason: HOSPADM

## 2024-08-30 RX ORDER — FENTANYL CITRATE 50 UG/ML
50 INJECTION, SOLUTION INTRAMUSCULAR; INTRAVENOUS EVERY 5 MIN PRN
Status: DISCONTINUED | OUTPATIENT
Start: 2024-08-30 | End: 2024-08-30 | Stop reason: HOSPADM

## 2024-08-30 RX ORDER — PROPOFOL 10 MG/ML
INJECTION, EMULSION INTRAVENOUS AS NEEDED
Status: DISCONTINUED | OUTPATIENT
Start: 2024-08-30 | End: 2024-08-30

## 2024-08-30 RX ORDER — ALBUMIN HUMAN 50 G/1000ML
SOLUTION INTRAVENOUS AS NEEDED
Status: DISCONTINUED | OUTPATIENT
Start: 2024-08-30 | End: 2024-08-30

## 2024-08-30 RX ORDER — SODIUM CHLORIDE, SODIUM LACTATE, POTASSIUM CHLORIDE, CALCIUM CHLORIDE 600; 310; 30; 20 MG/100ML; MG/100ML; MG/100ML; MG/100ML
INJECTION, SOLUTION INTRAVENOUS CONTINUOUS PRN
Status: DISCONTINUED | OUTPATIENT
Start: 2024-08-30 | End: 2024-08-30

## 2024-08-30 RX ORDER — SODIUM CHLORIDE, SODIUM LACTATE, POTASSIUM CHLORIDE, CALCIUM CHLORIDE 600; 310; 30; 20 MG/100ML; MG/100ML; MG/100ML; MG/100ML
100 INJECTION, SOLUTION INTRAVENOUS CONTINUOUS
Status: CANCELLED | OUTPATIENT
Start: 2024-08-30

## 2024-08-30 RX ORDER — CEFAZOLIN SODIUM 2 G/100ML
2 INJECTION, SOLUTION INTRAVENOUS ONCE
Status: COMPLETED | OUTPATIENT
Start: 2024-08-30 | End: 2024-08-30

## 2024-08-30 RX ORDER — GLYCOPYRROLATE 0.2 MG/ML
INJECTION INTRAMUSCULAR; INTRAVENOUS AS NEEDED
Status: DISCONTINUED | OUTPATIENT
Start: 2024-08-30 | End: 2024-08-30

## 2024-08-30 SDOH — HEALTH STABILITY: MENTAL HEALTH: CURRENT SMOKER: 0

## 2024-08-30 ASSESSMENT — PAIN - FUNCTIONAL ASSESSMENT
PAIN_FUNCTIONAL_ASSESSMENT: 0-10
PAIN_FUNCTIONAL_ASSESSMENT: UNABLE TO SELF-REPORT
PAIN_FUNCTIONAL_ASSESSMENT: 0-10
PAIN_FUNCTIONAL_ASSESSMENT: UNABLE TO SELF-REPORT
PAIN_FUNCTIONAL_ASSESSMENT: 0-10
PAIN_FUNCTIONAL_ASSESSMENT: 0-10
PAIN_FUNCTIONAL_ASSESSMENT: UNABLE TO SELF-REPORT

## 2024-08-30 ASSESSMENT — PAIN SCALES - GENERAL
PAINLEVEL_OUTOF10: 0 - NO PAIN
PAINLEVEL_OUTOF10: 8
PAINLEVEL_OUTOF10: 4
PAINLEVEL_OUTOF10: 5 - MODERATE PAIN
PAINLEVEL_OUTOF10: 4
PAINLEVEL_OUTOF10: 7
PAINLEVEL_OUTOF10: 2

## 2024-08-30 NOTE — DISCHARGE INSTRUCTIONS
Most ear surgeries should have a 2-4 week postoperative appointment. Please be sure to call the doctor's office and make a follow-up appointment, if you don't already have it.  Dressing or Band-Aid can be removed the day after surgery.  Once the dressing is off, and if you have an incision behind your ear with stitches, clean the incision twice daily with soap and water and apply Vaseline or antibiotic ointment after cleaning. If you have paper strips or surgical glue over the incision, Do not apply anything behind the ear.  You may shower the day after surgery, if you keep your head dry.  The hair may be shampooed 2 days following surgery.    Bloody discharge from incision area may occur during the first 10 days following surgery.  If this persists or increases, please call the office.  A full sensation with popping sounds may be noticed during the healing process.  DO NOT BLOW YOUR NOSE FOR THREE WEEKS FOLLOWING SURGERY. If you sneeze, do so with your mouth open for three weeks following surgery. Do not use a straw to drink beverages for 3 weeks following surgery.  Do not be concerned regarding your hearing for a period of six to eight weeks following surgery.  Your hearing will be evaluated at this time; until then, your hearing may sound muffled and your voice may echo in your ear during speech.  Minor swelling of the face on the same side of the surgery is not uncommon.  Small bruising near the eye or mouth is not uncommon from the facial nerve monitor.  Dizziness, ringing in the ear, and taste disturbance after surgery are common. Call if severe.   You might notice pain when chewing, please use soft diet for 2 weeks if you experience this.  No lifting (more than 10 lbs) or straining until follow up.  You will be discharged on pain medications and usually antibiotics.  You may resume your routine medications as directed, unless you have been instructed otherwise by the prescribing healthcare  provider.  Should you experience any difficulty upon returning home, or if you simply have questions, please contact us.  As your surgeons, we are most familiar with your operation and postoperative procedures.  We are accessible by telephone 24 hours a day, 7 days a week.  Once we have assessed your situation, we will be prepared to make specific suggestions for your care.

## 2024-08-30 NOTE — ANESTHESIA POSTPROCEDURE EVALUATION
Patient: Giles Becerra    Procedure Summary       Date: 08/30/24 Room / Location: [facility] OR 05 / [facility] OR    Anesthesia Start: 0733 Anesthesia Stop: 1154    Procedure: Insertion Cochlear Implant (Right: Ear) Diagnosis:       Sensorineural hearing loss (SNHL) of both ears      (Sensorineural hearing loss (SNHL) of both ears [H90.3])    Surgeons: Trever Villegas MD Responsible Provider: Rona Prather MD    Anesthesia Type: general ASA Status: 3            Anesthesia Type: general    Vitals Value Taken Time   /85 08/30/24 1150   Temp 36.8 °C (98.2 °F) 08/30/24 1148   Pulse 68 08/30/24 1157   Resp 16 08/30/24 1157   SpO2 96 % 08/30/24 1157   Vitals shown include unfiled device data.    Anesthesia Post Evaluation    Patient location during evaluation: PACU  Patient participation: complete - patient participated  Level of consciousness: awake and alert  Pain management: adequate  Airway patency: patent  Cardiovascular status: acceptable  Respiratory status: acceptable  Hydration status: acceptable  Postoperative Nausea and Vomiting: none        No notable events documented.

## 2024-08-30 NOTE — ANESTHESIA PROCEDURE NOTES
Airway  Date/Time: 8/30/2024 7:42 AM  Urgency: elective    Airway not difficult    Staffing  Performed: resident   Authorized by: Rona Prather MD    Performed by: Matilda Tay MD  Patient location during procedure: OR    Indications and Patient Condition  Indications for airway management: anesthesia  Spontaneous Ventilation: absent  Sedation level: deep  Preoxygenated: yes  Patient position: sniffing  Mask difficulty assessment: 1 - vent by mask  Planned trial extubation    Final Airway Details  Final airway type: endotracheal airway      Successful airway: ETT  Cuffed: yes   Successful intubation technique: direct laryngoscopy  Facilitating devices/methods: intubating stylet  Endotracheal tube insertion site: oral  Blade: João  Blade size: #4  ETT size (mm): 7.0  Cormack-Lehane Classification: grade I - full view of glottis  Placement verified by: chest auscultation and capnometry   Measured from: teeth  ETT to teeth (cm): 21  Number of attempts at approach: 1

## 2024-08-30 NOTE — ANESTHESIA PREPROCEDURE EVALUATION
Patient: Giels Becerra    Procedure Information       Date/Time: 08/30/24 0645    Procedure: Insertion Cochlear Implant (Right) - OR time: 2.5hours    Location: [facility] OR  / [facility] OR    Surgeons: Trever Villegas MD            Relevant Problems   Cardiac   (+) CAD (coronary artery disease)   (+) HTN (hypertension)   (+) History of coronary artery bypass graft   (+) Hyperlipidemia      /Renal   (+) BPH (benign prostatic hyperplasia)      Endocrine   (+) Hypothyroidism      HEENT   (+) Sensorineural hearing loss (SNHL) of both ears       Clinical information reviewed:   Tobacco  Allergies  Meds   Med Hx  Surg Hx   Fam Hx  Soc Hx        NPO Detail:  NPO/Void Status  Carbohydrate Drink Given Prior to Surgery? : N  Date of Last Liquid: 08/30/24  Time of Last Liquid: 0300  Date of Last Solid: 08/29/24  Time of Last Solid: 1900  Last Intake Type: Clear fluids  Time of Last Void: 0600         Physical Exam    Airway  Mallampati: III  TM distance: <3 FB  Neck ROM: limited     Cardiovascular - normal exam  Rhythm: regular  Rate: normal     Dental    Pulmonary   Breath sounds clear to auscultation     Abdominal   (+) obese             Anesthesia Plan    History of general anesthesia?: yes  History of complications of general anesthesia?: no    ASA 3     general     The patient is not a current smoker.  Patient did not smoke on day of procedure.    intravenous induction   Postoperative administration of opioids is intended.  Trial extubation is planned.  Anesthetic plan and risks discussed with patient and spouse.  Use of blood products discussed with patient and spouse who consented to blood products.    Plan discussed with resident.

## 2024-08-30 NOTE — OP NOTE
OPERATIVE NOTE     Date:  2024 OR Location: [facility] OR    Name: Giles Becerra : 1955, Age: 69 y.o., MRN: [id number], Sex: male      Surgeon      Trever Villegas MD    Covering Surgeon      Jada Solano MD    Resident/Fellow/Other Assistant:  Chris Marino MD    Anesthesia: General  ASA: III  Anesthesia Staff: Anesthesiologist: Rona Prather MD  Staff: Circulator: Grace Reed RN; Lashon Whittaker RN  Scrub Person: Sharlene Alcocer; Glendy Blake RN         Preoperative Diagnosis:  1. Sensorineural Hearing Loss   - Bilateral.    Postoperative Diagnosis:  1. Sensorineural Hearing Loss   - Bilateral.      Procedure Performed:  1.  Cochlear Implantation (46794)   - Right  2. Needle electromyography; cranial nerve supplied muscle(s), unilateral   - Facial nerve.   - Right  3. Microsurgical techniques, requiring use of operating microscope   - Right      Indications:  Giles Becerra is a very pleasant 69 y.o. male who presents with Bilateral severe-to-profound, sensorineural hearing loss and poor discrimination. The patient meets criteria for FDA insertion of cochlear implant. The risks, indications, and complications of surgery were discussed including, but not limited to facial nerve injury, deafness in the operated ear, vertigo, dizziness, imbalance, facial weakness or paralysis, change in sense of taste, perforation of eardrum, pain, bleeding, infection, scarring, need for further surgery, device failure, device extrusion, spinal fluid leak, meningitis, brain damage, brain abscess, stroke, and death. Informed consent was obtained. We also confirmed the patient received the Pneumococcus vaccine prior to surgery. Because of the extensive nature of the dissection and the close proximity of the facial nerve, facial nerve monitoring was used throughout the case.       Operative Findings:  1. Well aerated mastoid.  2.  stimulator position: under temporalis  muscle.  2. Facial nerve intact in normal position.  3. Chorda tympani: intact.   4. Approach: Round window  5. Implant/ electrode: Cochlear  placed.  6. Favorable anatomy.   7. Insertion:   - Very smooth full insertion.  - Speed:  60-90sec.  - Marker: at RW.   - No resistance.  8. Neural response telemetry: good response.  9. Intraop testing:   - X-ray: Not Performed.   - SmartNav position check: Normal.   - ECOG: Not performed.    - eSRT: Performed.      Operative Technique:   After informed consent was obtained, the patient was taken to the operating room and placed on the operating room table in the supine position. Anesthesia was induced by the anesthesia team without difficulty. Facial nerve monitoring electrodes were placed in the orbicularis oris muscle and orbicularis oculi muscle and the monitor was activated. Lidocaine with epinephrine was injected in the postauricular area of the incision and the patient was then prepped and draped in standard sterile fashion.    The postauricular incision was made down through the skin and soft tissue to the temporalis muscle and the ear was reflected forward in its avascular plane. Bovie cautery was then used to make a cut through musculofascial layers along the linea temporalis and this then was dissected down to the mastoid tip. Lempert periosteal elevator was used to elevate the soft tissues anteriorly, superiorly, and posteriorly, thus exposing the mastoid and the opening of the external auditory canal. We then used the Lempert elevator to elevate the periosteum off of the skull anteriorly for the ground electrode. The Lempert was then used posterior and superior to the mastoid to develop a tight pocket. Muscle from the temporalis muscle was then harvested for use later in the procedure. Dura hooks where used to hold our view.    A standard mastoidectomy was then completed, carefully identifying the tegmen, sinodural angle and posterior canal wall. We identify  the lateral semicircular canal and short process of the incus. Using 3-joselyn fermin, and progressing down to a 2-joselyn fermin we opened the facial recess. We carefully identified the chorda tympani, the incus buttress, and the facial nerve. The facial recess was opened exposing the round window and the rest of the middle ear. The mucosa over the round window niche was elevated using the right angle. The round window niche was identified and its lip was drilled to have a 360-degree view of it.    A 2 mm joselyn drill was used to created holes at the lateral edge of the mastoidectomy to secure the internal  in place. The ear was copiously irrigated with saline.  The cochlear implant was then brought onto the operative field and placed in our subperiosteal pocket. The cochlear implant was then secured in its position with Vicryl 3-0, tagging the opening of the subperiosteal pocket down to bone.    Copious irrigation was then performed. At this point, we performed the opening of the round window with a small right angle. Using microsuction and the jeweler's forceps, we inserted the cochlear implant electrode without resistance using off-sheath technique. The electrode was advanced slowly past the marker on the array, and a pull-back technique was used. The insertion was very smooth. The insertion site was then packed with muscle, leaving the electrode lead inferiorly in the facial recess. The electrode was then carefully coiled back into the mastoid and a piece of Gelfoam was placed over the mastoid to protect the electrodes during closure.     The wound was then closed in 3 layers using a 3-0 Vicryl for the muscle, 4-0 Vicryl for the subcuticular stitches and 5-0 fast gut suture for the dermis. During this time, the audiologist was here to induct neural response telemetry of the cochlear implant and got good responses. eSRT and SmartNav were performed. At the conclusion of this procedure the incision was  covered with a sterile dressing and the draping was removed. A [place] dressing was applied to the patient's ear. The patient's care was returned to anesthesia for awakening.    This completed the procedure. The patient was then emerged from anesthesia and extubated without difficulty. The patient was then transported back to PACU. There were no apparent complications. Following the procedure, the findings were discussed with the patient's family and all of their questions were answered.    Attending Attestation: Dr. Velazquez was present during all critical and key portions of the procedure and was immediately available to furnish services the entire duration.       Implants:   Implant Name Type Inv. Item Serial No.  Lot No. LRB No. Used Action   COCHLEAR, NUCLEUS , PROFILE PLUS W/SLIM MODIOLAR ELECTRODE - [id number] - [id number] ENT Implant COCHLEAR, NUCLEUS , PROFILE PLUS W/SLIM MODIOLAR ELECTRODE [id number] [employer]  Right 1 Implanted     Specimens: No specimens collected  Estimated Blood Loss:  5cc  Complications: none  Condition of the patient: Stable  Disposition: PACU    ____________________________________________________  Trever Velazquez MD  Professor and Chief   Otology/Neurotology/Lateral Skull-Base Surgery   [facility]  Phone: [phone]  Fax: [phone]

## 2024-08-30 NOTE — PROGRESS NOTES
Cochlear Implant Intra-Operative Monitoring     Date: 08/30/24   Surgeon: Amanda Marino MD / Trever Velazquez MD  Audiology Extern: MARIO Yip       Implanted Ear: Right  : Cochlear [employer]  Implant:   Serial Number: [id number]      Electrode Insertion Achieved: Full    Placement Check: Within normal limits    Impedances:  Impedances were measured on electrodes 1-22 and within normal limits.    eSRT: ESRTs were obtained using a pulse width of 50 and a stimulation rate of 900 Hz.  Responses were obtained at electrodes 1, 6, 12, 17, & 22. See below.        NRTs: Completed on all electrodes, see below.        Equipment and Forms:   The patient's equipment backpack was provided to the family. They were counseled to bring the backpack to activation. Post-operative course was reviewed with the family. The patient is instructed to stop using their hearing aid on the newly implanted ear. They are encouraged to continue using their hearing aid on the non-implanted ear.     The family was provided with the internal implant guide, MRI compatibility booklet, and implant identification card specific to the patient's implant. The patient should place this in their wallet. The family was counseled that the internal implant was registered; indicating the start of the 10 year 's warranty. The external equipment, located in the backpack provided, will be registered at the activation; indicating the start of the 5 year 's warranty on the processor.     The activation follow up schedule was reviewed and a completed form with the appointments was provided. The auditory verbal therapy (AVT) form was provided to the family. The patient is guided to schedule AVT between the 2nd and 3rd activation by the Cochlear Implant team.     Post operative questions should be guided to the Patient Navigator Cochlear Implant Program, Sharlene Josue RN  at [phone].    Appointments  The patient is scheduled for follow up with Trever Velazquez MD. He was referred to  by [facility] and should return there for audiologic follow ups.      Intraoperative testing was completed by MARIO Yip. Reviewed by Homero Baker, Ph.D. CCC-A

## 2024-09-04 ENCOUNTER — PATIENT MESSAGE (OUTPATIENT)
Dept: OTOLARYNGOLOGY | Facility: HOSPITAL | Age: 69
End: 2024-09-04
Payer: COMMERCIAL

## 2024-09-10 ENCOUNTER — APPOINTMENT (OUTPATIENT)
Dept: OTOLARYNGOLOGY | Facility: CLINIC | Age: 69
End: 2024-09-10
Payer: COMMERCIAL

## 2024-09-10 VITALS — WEIGHT: 218 LBS | HEIGHT: 70 IN | BODY MASS INDEX: 31.21 KG/M2

## 2024-09-10 DIAGNOSIS — Z96.21 COCHLEAR IMPLANT STATUS: ICD-10-CM

## 2024-09-10 DIAGNOSIS — Z01.818 PRE-OP TESTING: ICD-10-CM

## 2024-09-10 DIAGNOSIS — H93.8X1: ICD-10-CM

## 2024-09-10 DIAGNOSIS — H90.3 SENSORINEURAL HEARING LOSS (SNHL) OF BOTH EARS: Primary | ICD-10-CM

## 2024-09-10 DIAGNOSIS — H90.3 SENSORINEURAL HEARING LOSS, ASYMMETRICAL: ICD-10-CM

## 2024-09-10 DIAGNOSIS — H93.293 IMPAIRMENT OF AUDITORY DISCRIMINATION OF BOTH EARS: ICD-10-CM

## 2024-09-10 PROCEDURE — 1160F RVW MEDS BY RX/DR IN RCRD: CPT | Performed by: OTOLARYNGOLOGY

## 2024-09-10 PROCEDURE — 99024 POSTOP FOLLOW-UP VISIT: CPT | Performed by: OTOLARYNGOLOGY

## 2024-09-10 PROCEDURE — 1159F MED LIST DOCD IN RCRD: CPT | Performed by: OTOLARYNGOLOGY

## 2024-09-10 PROCEDURE — 3008F BODY MASS INDEX DOCD: CPT | Performed by: OTOLARYNGOLOGY

## 2024-09-10 RX ORDER — SULFAMETHOXAZOLE AND TRIMETHOPRIM 800; 160 MG/1; MG/1
1 TABLET ORAL 2 TIMES DAILY
Qty: 20 TABLET | Refills: 0 | Status: SHIPPED | OUTPATIENT
Start: 2024-09-10 | End: 2024-09-20

## 2024-09-10 RX ORDER — CEFAZOLIN SODIUM 2 G/100ML
2 INJECTION, SOLUTION INTRAVENOUS ONCE
OUTPATIENT
Start: 2024-09-10 | End: 2024-09-10

## 2024-09-10 RX ORDER — SODIUM CHLORIDE 9 MG/ML
20 INJECTION, SOLUTION INTRAVENOUS CONTINUOUS
OUTPATIENT
Start: 2024-09-10

## 2024-09-10 NOTE — PROGRESS NOTES
Reason for Consult:  sensorineural hearing loss s/p cochlear implantation      Subjective   History Of Present Illness:  Giles Becerra is a 69 y.o. male with history of open heart surgery and severe bilateral sensory hearing loss that is worse in right compared to left. Meets FDA criteria for cochlear implantation. He continues use of hearing aid in the left ear and decided to move forward with the right.     I due to operating room on 8/2024 and perform a right-sided cochlear plantation with a  electrode.    During surgery we encountered:  1. Well aerated mastoid.  2.  stimulator position: under temporalis muscle.  2. Facial nerve intact in normal position.  3. Chorda tympani: intact.   4. Approach: Round window  5. Implant/ electrode: Cochlear  placed.  6. Favorable anatomy.   7. Insertion:   - Very smooth full insertion.  - Speed:  60-90sec.  - Marker: at RW.   - No resistance.  8. Neural response telemetry: good response.  9. Intraop testing:              - X-ray: Not Performed.              - SmartNav position check: Normal.              - ECOG: Not performed.               - eSRT: Performed.      Since she has been doing fantastic and he has not had any problems.     Past Medical History:  He has a past medical history of Arthritis, CHF (congestive heart failure) (Multi), Coronary artery disease, HL (hearing loss) (1955), Hyperlipidemia, Hypertension, and Hypothyroidism.    Surgical History:  He has a past surgical history that includes Tonsillectomy (01/15/1965); Coronary artery bypass graft; and Finger surgery.     Social History:  He reports that he has never smoked. He has never used smokeless tobacco. He reports that he does not drink alcohol and does not use drugs.    Family History:  Family history is unknown by patient.     Medications:  Current Outpatient Medications   Medication Instructions    acetaminophen (TYLENOL) 650 mg, oral, Every 6 hours PRN    amLODIPine  (Norvasc) 10 mg tablet     aspirin 81 mg, oral, Daily    atorvastatin (Lipitor) 40 mg tablet     benazepril (Lotensin) 40 mg tablet     carvedilol (Coreg) 6.25 mg tablet     ibuprofen 600 mg, oral, Every 6 hours PRN    levothyroxine (Synthroid, Levoxyl) 112 mcg tablet oral    mupirocin (Bactroban) 2 % ointment Apply to both inner nostrils twice a day for 7 days    nitroglycerin (Nitrostat) 0.4 mg SL tablet     ondansetron (ZOFRAN) 4 mg, oral, Every 8 hours PRN    tamsulosin (Flomax) 0.4 mg 24 hr capsule     traMADol (ULTRAM) 50 mg, oral, Every 4 hours PRN      Allergies:  Patient has no known allergies.    Review of Systems:   A comprehensive 10-point review of systems was obtained including constitutional, neurological, HEENT, pulmonary, cardiovascular, genito-urinary, and other pertinent systems and was negative except as noted in the HPI.     Objective   Physical Exam:  Last Recorded Vitals: There were no vitals taken for this visit.    On physical exam, the patient is a well-nourished, well-developed patient, in no acute distress, able to communicate without assistance in English language. Head and face is atraumatic and normocephalic. Salivary glands are intact. Facial strength is symmetrical bilaterally.       On ear examination:  Right ear: The patient has an open and patent ear canal. The tympanic membrane is intact.    Left ear: The patient has an open and patent ear canal. The tympanic membrane is intact.    On vestibular exam, the patient has no spontaneous nystagmus, no headshake nystagmus, no head-thrust nystagmus, and no nystagmus on hyperventilation or Valsalva maneuvers. Heidy-Hallpike maneuver is negative bilaterally.       On neuro exam, the patient is alert and oriented x3, cranial nerves are grossly intact, cerebellar exam is normal.      The rest of the exam, including anterior rhinoscopy, oropharyngeal exam, neck exam, and cardiovascular exam, were normal including no palpable lymphadenopathies,  thyroid in the midline position, normal pulses, and normal chest excursion.       Reviewed Results:  Audiology Testing:  I personally reviewed the CI evaluation from [facility] from  06/2024 that showed:   Right ear: 0% CNC in quiet, 0% AzBio in quiet.    Left ear: 28% CNC in quiet, 28% AzBio in quiet, 15% AzBio +10 SNR.                Imaging:  I personally reviewed his CT IAC from 07/2024:  Patent cochlea bilaterally and normal positioning of the facial nerve    I personally reviewed his MRI IAC from 07/2024:  No retrocochlear pathology.     Procedure:  None    Assessment/Plan     1. Sensorineural hearing loss (SNHL) of both ears    2. Impairment of auditory discrimination of both ears    3. Sensorineural hearing loss, asymmetrical        In summary, Giles Becerra is a 69 y.o. male with history of open heart surgery and severe bilateral sensory hearing loss that is worse in right compared to left. Meets FDA criteria for cochlear plantation. He continues to use of hearing aid in the left ear and decided to move forward with the right.    He is now status post right-sided cochlear implantation with a  electrode.  Since surgery he has been doing well and has not had any major problems.  He is scheduled for activation in the upcoming days. He has little swelling around implant. I put him on bactrim.    He is interested in contralateral implantation.  Will plan on doing this before the end of the year.    The risks, indications, and complications of doing a left-sided cochlear implantation was discussed with the patient.  He elected to proceed.  We will schedule this in near future.    I will see him back in 2 months prior to contralateral CI.        Scribe Attestation  By signing my name below, INadja , Justin attest that this documentation has been prepared under the direction and in the presence of Trever Villegas MD.     ____________________________________________________  Trever Velazquez MD  Professor and Chief   Otology/Neurotology/Lateral Skull-Base Surgery   [facility]

## 2024-09-10 NOTE — LETTER
September 17, 2024     Melquiades Phillips    Patient: Giles Becerra   YOB: 1955   Date of Visit: 9/10/2024       Dear Melquiades Avina:    Thank you for referring Giles Becerra to me for evaluation. Below are my notes for this consultation.  If you have questions, please do not hesitate to call me. I look forward to following your patient along with you.       Sincerely,     Trever Villegas MD      CC: MD Radha Ewing AuD  ______________________________________________________________________________________            Reason for Consult:  sensorineural hearing loss s/p cochlear implantation      Subjective  History Of Present Illness:  Giles Becerra is a 69 y.o. male with history of open heart surgery and severe bilateral sensory hearing loss that is worse in right compared to left. Meets FDA criteria for cochlear implantation. He continues use of hearing aid in the left ear and decided to move forward with the right.     I due to operating room on 8/2024 and perform a right-sided cochlear plantation with a  electrode.    During surgery we encountered:  1. Well aerated mastoid.  2.  stimulator position: under temporalis muscle.  2. Facial nerve intact in normal position.  3. Chorda tympani: intact.   4. Approach: Round window  5. Implant/ electrode: Cochlear  placed.  6. Favorable anatomy.   7. Insertion:   - Very smooth full insertion.  - Speed:  60-90sec.  - Marker: at RW.   - No resistance.  8. Neural response telemetry: good response.  9. Intraop testing:              - X-ray: Not Performed.              - SmartNav position check: Normal.              - ECOG: Not performed.               - eSRT: Performed.      Since she has been doing fantastic and he has not had any problems.     Past Medical History:  He has a past medical history of Arthritis, CHF (congestive heart failure) (Multi), Coronary artery disease, HL (hearing loss)  (1955), Hyperlipidemia, Hypertension, and Hypothyroidism.    Surgical History:  He has a past surgical history that includes Tonsillectomy (01/15/1965); Coronary artery bypass graft; and Finger surgery.     Social History:  He reports that he has never smoked. He has never used smokeless tobacco. He reports that he does not drink alcohol and does not use drugs.    Family History:  Family history is unknown by patient.     Medications:  Current Outpatient Medications   Medication Instructions   • acetaminophen (TYLENOL) 650 mg, oral, Every 6 hours PRN   • amLODIPine (Norvasc) 10 mg tablet    • aspirin 81 mg, oral, Daily   • atorvastatin (Lipitor) 40 mg tablet    • benazepril (Lotensin) 40 mg tablet    • carvedilol (Coreg) 6.25 mg tablet    • ibuprofen 600 mg, oral, Every 6 hours PRN   • levothyroxine (Synthroid, Levoxyl) 112 mcg tablet oral   • mupirocin (Bactroban) 2 % ointment Apply to both inner nostrils twice a day for 7 days   • nitroglycerin (Nitrostat) 0.4 mg SL tablet    • ondansetron (ZOFRAN) 4 mg, oral, Every 8 hours PRN   • tamsulosin (Flomax) 0.4 mg 24 hr capsule    • traMADol (ULTRAM) 50 mg, oral, Every 4 hours PRN      Allergies:  Patient has no known allergies.    Review of Systems:   A comprehensive 10-point review of systems was obtained including constitutional, neurological, HEENT, pulmonary, cardiovascular, genito-urinary, and other pertinent systems and was negative except as noted in the HPI.     Objective  Physical Exam:  Last Recorded Vitals: There were no vitals taken for this visit.    On physical exam, the patient is a well-nourished, well-developed patient, in no acute distress, able to communicate without assistance in English language. Head and face is atraumatic and normocephalic. Salivary glands are intact. Facial strength is symmetrical bilaterally.       On ear examination:  Right ear: The patient has an open and patent ear canal. The tympanic membrane is intact.    Left ear:  The patient has an open and patent ear canal. The tympanic membrane is intact.    On vestibular exam, the patient has no spontaneous nystagmus, no headshake nystagmus, no head-thrust nystagmus, and no nystagmus on hyperventilation or Valsalva maneuvers. Heidy-Hallpike maneuver is negative bilaterally.       On neuro exam, the patient is alert and oriented x3, cranial nerves are grossly intact, cerebellar exam is normal.      The rest of the exam, including anterior rhinoscopy, oropharyngeal exam, neck exam, and cardiovascular exam, were normal including no palpable lymphadenopathies, thyroid in the midline position, normal pulses, and normal chest excursion.       Reviewed Results:  Audiology Testing:  I personally reviewed the CI evaluation from [facility] from  06/2024 that showed:   Right ear: 0% CNC in quiet, 0% AzBio in quiet.    Left ear: 28% CNC in quiet, 28% AzBio in quiet, 15% AzBio +10 SNR.                Imaging:  I personally reviewed his CT IAC from 07/2024:  Patent cochlea bilaterally and normal positioning of the facial nerve    I personally reviewed his MRI IAC from 07/2024:  No retrocochlear pathology.     Procedure:  None    Assessment/Plan    1. Sensorineural hearing loss (SNHL) of both ears    2. Impairment of auditory discrimination of both ears    3. Sensorineural hearing loss, asymmetrical        In summary, Giles Becerra is a 69 y.o. male with history of open heart surgery and severe bilateral sensory hearing loss that is worse in right compared to left. Meets FDA criteria for cochlear plantation. He continues to use of hearing aid in the left ear and decided to move forward with the right.    He is now status post right-sided cochlear implantation with a  electrode.  Since surgery he has been doing well and has not had any major problems.  He is scheduled for activation in the upcoming days. He has little swelling around implant. I put him on bactrim.    He is  interested in contralateral implantation.  Will plan on doing this before the end of the year.    The risks, indications, and complications of doing a left-sided cochlear implantation was discussed with the patient.  He elected to proceed.  We will schedule this in near future.    I will see him back in 2 months prior to contralateral CI.        Scribe Attestation  By signing my name below, I, Nadja East , Scribe attest that this documentation has been prepared under the direction and in the presence of Trever Villegas MD.    ____________________________________________________  Trever Velazquez MD  Professor and Chief   Otology/Neurotology/Lateral Skull-Base Surgery   [facility]

## 2024-09-10 NOTE — LETTER
September 10, 2024     Radha Odonnell, Melquiades  [address] [facility]  Audiology Services, [address]  [facility] [address]    Patient: Giles Becerra   YOB: 1955   Date of Visit: 9/10/2024       Dear Dr. Radha Odonnell, AuD:    Thank you for referring Giles Becerra to me for evaluation. Below are my notes for this consultation.  If you have questions, please do not hesitate to call me. I look forward to following your patient along with you.       Sincerely,     Trever Villegas MD      CC: Cory Hoffman MD  ______________________________________________________________________________________            Reason for Consult:  sensorineural hearing loss s/p cochlear implantation      Subjective  History Of Present Illness:  Giles Becerra is a 69 y.o. male with history of open heart surgery and severe bilateral sensory hearing loss that is worse in right compared to left. Meets FDA criteria for cochlear plantation. He continues use of hearing aid in the left ear and decided to move forward with the right.     I due to operating room on 8/2024 and perform a right-sided cochlear plantation with a  electrode.    During surgery we encountered:  1. Well aerated mastoid.  2.  stimulator position: under temporalis muscle.  2. Facial nerve intact in normal position.  3. Chorda tympani: intact.   4. Approach: Round window  5. Implant/ electrode: Cochlear  placed.  6. Favorable anatomy.   7. Insertion:   - Very smooth full insertion.  - Speed:  60-90sec.  - Marker: at RW.   - No resistance.  8. Neural response telemetry: good response.  9. Intraop testing:              - X-ray: Not Performed.              - SmartNav position check: Normal.              - ECOG: Not performed.               - eSRT: Performed.      Since she has been doing fantastic and he has not had any problems.     Past Medical History:  He has a past medical history of Arthritis, CHF (congestive heart  failure) (Multi), Coronary artery disease, HL (hearing loss) (1955), Hyperlipidemia, Hypertension, and Hypothyroidism.    Surgical History:  He has a past surgical history that includes Tonsillectomy (01/15/1965); Coronary artery bypass graft; and Finger surgery.     Social History:  He reports that he has never smoked. He has never used smokeless tobacco. He reports that he does not drink alcohol and does not use drugs.    Family History:  Family history is unknown by patient.     Medications:  Current Outpatient Medications   Medication Instructions   • acetaminophen (TYLENOL) 650 mg, oral, Every 6 hours PRN   • amLODIPine (Norvasc) 10 mg tablet    • aspirin 81 mg, oral, Daily   • atorvastatin (Lipitor) 40 mg tablet    • benazepril (Lotensin) 40 mg tablet    • carvedilol (Coreg) 6.25 mg tablet    • ibuprofen 600 mg, oral, Every 6 hours PRN   • levothyroxine (Synthroid, Levoxyl) 112 mcg tablet oral   • mupirocin (Bactroban) 2 % ointment Apply to both inner nostrils twice a day for 7 days   • nitroglycerin (Nitrostat) 0.4 mg SL tablet    • ondansetron (ZOFRAN) 4 mg, oral, Every 8 hours PRN   • tamsulosin (Flomax) 0.4 mg 24 hr capsule    • traMADol (ULTRAM) 50 mg, oral, Every 4 hours PRN      Allergies:  Patient has no known allergies.    Review of Systems:   A comprehensive 10-point review of systems was obtained including constitutional, neurological, HEENT, pulmonary, cardiovascular, genito-urinary, and other pertinent systems and was negative except as noted in the HPI.     Objective  Physical Exam:  Last Recorded Vitals: There were no vitals taken for this visit.    On physical exam, the patient is a well-nourished, well-developed patient, in no acute distress, able to communicate without assistance in English language. Head and face is atraumatic and normocephalic. Salivary glands are intact. Facial strength is symmetrical bilaterally.       On ear examination:  Right ear: The patient has an open and  patent ear canal. The tympanic membrane is intact.    Left ear: The patient has an open and patent ear canal. The tympanic membrane is intact.    On vestibular exam, the patient has no spontaneous nystagmus, no headshake nystagmus, no head-thrust nystagmus, and no nystagmus on hyperventilation or Valsalva maneuvers. Orient-Hallpike maneuver is negative bilaterally.       On neuro exam, the patient is alert and oriented x3, cranial nerves are grossly intact, cerebellar exam is normal.      The rest of the exam, including anterior rhinoscopy, oropharyngeal exam, neck exam, and cardiovascular exam, were normal including no palpable lymphadenopathies, thyroid in the midline position, normal pulses, and normal chest excursion.       Reviewed Results:  Audiology Testing:  I personally reviewed the CI evaluation from [facility] from  06/2024 that showed:   Right ear: 0% CNC in quiet, 0% AzBio in quiet.    Left ear: 28% CNC in quiet, 28% AzBio in quiet, 15% AzBio +10 SNR.                Imaging:  I personally reviewed his CT IAC from 07/2024:  Patent cochlea bilaterally and normal positioning of the facial nerve    I personally reviewed his MRI IAC from 07/2024:  No retrocochlear pathology.     Procedure:  None    Assessment/Plan    1. Sensorineural hearing loss (SNHL) of both ears    2. Impairment of auditory discrimination of both ears    3. Sensorineural hearing loss, asymmetrical        In summary, Giles Becerra is a 69 y.o. male with history of open heart surgery and severe bilateral sensory hearing loss that is worse in right compared to left. Meets FDA criteria for cochlear plantation. He continues use of hearing aid in the left ear and decided to move forward with the right.    He is now status post right-sided cochlear implantation with a  electrode.  Since surgery he has been doing well and has not had any major problems.  He is scheduled for activation in the upcoming days.    I will see him  back in 6 months or sooner if he has any problems.         Scribe Attestation  By signing my name below, I, Nadja East , Justin attest that this documentation has been prepared under the direction and in the presence of Trever Villegas MD.    ____________________________________________________  Trever Velazquez MD  Professor and Chief   Otology/Neurotology/Lateral Skull-Base Surgery   [facility]

## 2024-09-13 ENCOUNTER — APPOINTMENT (OUTPATIENT)
Dept: OTOLARYNGOLOGY | Facility: CLINIC | Age: 69
End: 2024-09-13
Payer: COMMERCIAL

## 2024-09-25 ENCOUNTER — TELEPHONE (OUTPATIENT)
Dept: OTOLARYNGOLOGY | Facility: HOSPITAL | Age: 69
End: 2024-09-25
Payer: COMMERCIAL

## 2024-09-25 DIAGNOSIS — Z01.818 PRE-OP TESTING: ICD-10-CM

## 2024-09-26 ENCOUNTER — APPOINTMENT (OUTPATIENT)
Dept: PREADMISSION TESTING | Facility: HOSPITAL | Age: 69
End: 2024-09-26
Payer: COMMERCIAL

## 2024-10-22 ENCOUNTER — TELEPHONE (OUTPATIENT)
Dept: OTOLARYNGOLOGY | Facility: HOSPITAL | Age: 69
End: 2024-10-22
Payer: COMMERCIAL

## 2024-10-28 ENCOUNTER — APPOINTMENT (OUTPATIENT)
Dept: SPEECH THERAPY | Facility: CLINIC | Age: 69
End: 2024-10-28
Payer: COMMERCIAL

## 2024-11-25 ENCOUNTER — TELEPHONE (OUTPATIENT)
Dept: OTOLARYNGOLOGY | Facility: CLINIC | Age: 69
End: 2024-11-25
Payer: COMMERCIAL

## 2024-11-25 NOTE — TELEPHONE ENCOUNTER
Explained necessity of pre-admission testing appointment and that  it is only good for 90 days prior to appointment. He will await the PAT schedulers call.

## 2024-12-03 ENCOUNTER — APPOINTMENT (OUTPATIENT)
Dept: OTOLARYNGOLOGY | Facility: CLINIC | Age: 69
End: 2024-12-03
Payer: COMMERCIAL

## 2024-12-04 ENCOUNTER — TELEPHONE (OUTPATIENT)
Dept: OTOLARYNGOLOGY | Facility: CLINIC | Age: 69
End: 2024-12-04
Payer: COMMERCIAL

## 2024-12-04 NOTE — TELEPHONE ENCOUNTER
Attempted to call patient about upcoming surgery 12/27. Patient does not need repeat PAT testing. His post op appointment was also left in voicemail. Message left with call back if needed.

## 2024-12-26 ENCOUNTER — ANESTHESIA EVENT (OUTPATIENT)
Dept: OPERATING ROOM | Facility: HOSPITAL | Age: 69
End: 2024-12-26
Payer: COMMERCIAL

## 2024-12-27 ENCOUNTER — ANESTHESIA (OUTPATIENT)
Dept: OPERATING ROOM | Facility: HOSPITAL | Age: 69
End: 2024-12-27
Payer: COMMERCIAL

## 2024-12-27 ENCOUNTER — HOSPITAL ENCOUNTER (OUTPATIENT)
Facility: HOSPITAL | Age: 69
Setting detail: OUTPATIENT SURGERY
Discharge: HOME | End: 2024-12-27
Attending: OTOLARYNGOLOGY | Admitting: OTOLARYNGOLOGY
Payer: COMMERCIAL

## 2024-12-27 VITALS
HEART RATE: 80 BPM | DIASTOLIC BLOOD PRESSURE: 82 MMHG | BODY MASS INDEX: 31.66 KG/M2 | HEIGHT: 70 IN | WEIGHT: 221.12 LBS | TEMPERATURE: 96.8 F | SYSTOLIC BLOOD PRESSURE: 142 MMHG | RESPIRATION RATE: 11 BRPM | OXYGEN SATURATION: 92 %

## 2024-12-27 DIAGNOSIS — H90.3 SENSORINEURAL HEARING LOSS (SNHL) OF BOTH EARS: Primary | ICD-10-CM

## 2024-12-27 DIAGNOSIS — H90.3 SENSORINEURAL HEARING LOSS, ASYMMETRICAL: ICD-10-CM

## 2024-12-27 DIAGNOSIS — G89.18 POST-OP PAIN: ICD-10-CM

## 2024-12-27 PROCEDURE — 7100000001 HC RECOVERY ROOM TIME - INITIAL BASE CHARGE: Performed by: OTOLARYNGOLOGY

## 2024-12-27 PROCEDURE — 7100000009 HC PHASE TWO TIME - INITIAL BASE CHARGE: Performed by: OTOLARYNGOLOGY

## 2024-12-27 PROCEDURE — 2500000002 HC RX 250 W HCPCS SELF ADMINISTERED DRUGS (ALT 637 FOR MEDICARE OP, ALT 636 FOR OP/ED): Performed by: OTOLARYNGOLOGY

## 2024-12-27 PROCEDURE — L8690 AUD OSSEO DEV, INT/EXT COMP: HCPCS | Performed by: OTOLARYNGOLOGY

## 2024-12-27 PROCEDURE — 7100000010 HC PHASE TWO TIME - EACH INCREMENTAL 1 MINUTE: Performed by: OTOLARYNGOLOGY

## 2024-12-27 PROCEDURE — 69930 IMPLANT COCHLEAR DEVICE: CPT | Performed by: OTOLARYNGOLOGY

## 2024-12-27 PROCEDURE — 2720000007 HC OR 272 NO HCPCS: Performed by: OTOLARYNGOLOGY

## 2024-12-27 PROCEDURE — 2500000004 HC RX 250 GENERAL PHARMACY W/ HCPCS (ALT 636 FOR OP/ED): Performed by: OTOLARYNGOLOGY

## 2024-12-27 PROCEDURE — 3700000002 HC GENERAL ANESTHESIA TIME - EACH INCREMENTAL 1 MINUTE: Performed by: OTOLARYNGOLOGY

## 2024-12-27 PROCEDURE — 3700000001 HC GENERAL ANESTHESIA TIME - INITIAL BASE CHARGE: Performed by: OTOLARYNGOLOGY

## 2024-12-27 PROCEDURE — 2500000004 HC RX 250 GENERAL PHARMACY W/ HCPCS (ALT 636 FOR OP/ED): Mod: JZ | Performed by: NURSE ANESTHETIST, CERTIFIED REGISTERED

## 2024-12-27 PROCEDURE — 3600000004 HC OR TIME - INITIAL BASE CHARGE - PROCEDURE LEVEL FOUR: Performed by: OTOLARYNGOLOGY

## 2024-12-27 PROCEDURE — P9045 ALBUMIN (HUMAN), 5%, 250 ML: HCPCS | Mod: JZ | Performed by: NURSE ANESTHETIST, CERTIFIED REGISTERED

## 2024-12-27 PROCEDURE — 2780000003 HC OR 278 NO HCPCS: Performed by: OTOLARYNGOLOGY

## 2024-12-27 PROCEDURE — 2500000005 HC RX 250 GENERAL PHARMACY W/O HCPCS: Performed by: NURSE ANESTHETIST, CERTIFIED REGISTERED

## 2024-12-27 PROCEDURE — 2500000005 HC RX 250 GENERAL PHARMACY W/O HCPCS

## 2024-12-27 PROCEDURE — 2500000004 HC RX 250 GENERAL PHARMACY W/ HCPCS (ALT 636 FOR OP/ED): Performed by: STUDENT IN AN ORGANIZED HEALTH CARE EDUCATION/TRAINING PROGRAM

## 2024-12-27 PROCEDURE — 2500000005 HC RX 250 GENERAL PHARMACY W/O HCPCS: Performed by: OTOLARYNGOLOGY

## 2024-12-27 PROCEDURE — L8614 COCHLEAR DEVICE: HCPCS | Performed by: OTOLARYNGOLOGY

## 2024-12-27 PROCEDURE — 3600000009 HC OR TIME - EACH INCREMENTAL 1 MINUTE - PROCEDURE LEVEL FOUR: Performed by: OTOLARYNGOLOGY

## 2024-12-27 PROCEDURE — 95867 NDL EMG CRANIAL NRV MUSC UNI: CPT | Performed by: OTOLARYNGOLOGY

## 2024-12-27 PROCEDURE — 7100000002 HC RECOVERY ROOM TIME - EACH INCREMENTAL 1 MINUTE: Performed by: OTOLARYNGOLOGY

## 2024-12-27 DEVICE — COCHLEAR, NUCLEUS CI632, PROFILE PLUS W/SLIM MODIOLAR ELECTRODE: Type: IMPLANTABLE DEVICE | Site: EAR | Status: FUNCTIONAL

## 2024-12-27 DEVICE — PROCESSOR KIT, SINGLE N8: Type: IMPLANTABLE DEVICE | Site: EAR | Status: FUNCTIONAL

## 2024-12-27 RX ORDER — OXYCODONE HYDROCHLORIDE 5 MG/1
10 TABLET ORAL EVERY 4 HOURS PRN
Status: DISCONTINUED | OUTPATIENT
Start: 2024-12-27 | End: 2024-12-27 | Stop reason: HOSPADM

## 2024-12-27 RX ORDER — MIDAZOLAM HYDROCHLORIDE 1 MG/ML
INJECTION INTRAMUSCULAR; INTRAVENOUS AS NEEDED
Status: DISCONTINUED | OUTPATIENT
Start: 2024-12-27 | End: 2024-12-27

## 2024-12-27 RX ORDER — TRAMADOL HYDROCHLORIDE 50 MG/1
50 TABLET ORAL EVERY 6 HOURS PRN
Qty: 20 TABLET | Refills: 0 | Status: SHIPPED | OUTPATIENT
Start: 2024-12-27 | End: 2025-01-01

## 2024-12-27 RX ORDER — PHENYLEPHRINE HCL IN 0.9% NACL 0.4MG/10ML
SYRINGE (ML) INTRAVENOUS AS NEEDED
Status: DISCONTINUED | OUTPATIENT
Start: 2024-12-27 | End: 2024-12-27

## 2024-12-27 RX ORDER — REMIFENTANIL HYDROCHLORIDE 1 MG/ML
INJECTION, POWDER, LYOPHILIZED, FOR SOLUTION INTRAVENOUS AS NEEDED
Status: DISCONTINUED | OUTPATIENT
Start: 2024-12-27 | End: 2024-12-27

## 2024-12-27 RX ORDER — OXYCODONE HYDROCHLORIDE 5 MG/1
5 TABLET ORAL EVERY 4 HOURS PRN
Status: DISCONTINUED | OUTPATIENT
Start: 2024-12-27 | End: 2024-12-27 | Stop reason: HOSPADM

## 2024-12-27 RX ORDER — ROCURONIUM BROMIDE 10 MG/ML
INJECTION, SOLUTION INTRAVENOUS AS NEEDED
Status: DISCONTINUED | OUTPATIENT
Start: 2024-12-27 | End: 2024-12-27

## 2024-12-27 RX ORDER — MUPIROCIN 20 MG/G
OINTMENT TOPICAL AS NEEDED
Status: DISCONTINUED | OUTPATIENT
Start: 2024-12-27 | End: 2024-12-27 | Stop reason: HOSPADM

## 2024-12-27 RX ORDER — SODIUM CHLORIDE 0.9 G/100ML
IRRIGANT IRRIGATION AS NEEDED
Status: DISCONTINUED | OUTPATIENT
Start: 2024-12-27 | End: 2024-12-27 | Stop reason: HOSPADM

## 2024-12-27 RX ORDER — ALBUMIN HUMAN 50 G/1000ML
SOLUTION INTRAVENOUS AS NEEDED
Status: DISCONTINUED | OUTPATIENT
Start: 2024-12-27 | End: 2024-12-27

## 2024-12-27 RX ORDER — ONDANSETRON HYDROCHLORIDE 2 MG/ML
4 INJECTION, SOLUTION INTRAVENOUS ONCE AS NEEDED
Status: DISCONTINUED | OUTPATIENT
Start: 2024-12-27 | End: 2024-12-27 | Stop reason: HOSPADM

## 2024-12-27 RX ORDER — LIDOCAINE HYDROCHLORIDE AND EPINEPHRINE 10; 10 UG/ML; MG/ML
INJECTION, SOLUTION INFILTRATION; PERINEURAL AS NEEDED
Status: DISCONTINUED | OUTPATIENT
Start: 2024-12-27 | End: 2024-12-27 | Stop reason: HOSPADM

## 2024-12-27 RX ORDER — DOCUSATE SODIUM 100 MG/1
100 CAPSULE, LIQUID FILLED ORAL 2 TIMES DAILY PRN
Qty: 20 CAPSULE | Refills: 0 | Status: SHIPPED | OUTPATIENT
Start: 2024-12-27

## 2024-12-27 RX ORDER — FENTANYL CITRATE 50 UG/ML
INJECTION, SOLUTION INTRAMUSCULAR; INTRAVENOUS AS NEEDED
Status: DISCONTINUED | OUTPATIENT
Start: 2024-12-27 | End: 2024-12-27

## 2024-12-27 RX ORDER — CEPHALEXIN 500 MG/1
500 CAPSULE ORAL 3 TIMES DAILY
Qty: 15 CAPSULE | Refills: 0 | Status: SHIPPED | OUTPATIENT
Start: 2024-12-27 | End: 2025-01-01

## 2024-12-27 RX ORDER — SODIUM CHLORIDE 9 MG/ML
20 INJECTION, SOLUTION INTRAVENOUS CONTINUOUS
Status: DISCONTINUED | OUTPATIENT
Start: 2024-12-27 | End: 2024-12-27 | Stop reason: HOSPADM

## 2024-12-27 RX ORDER — DROPERIDOL 2.5 MG/ML
0.62 INJECTION, SOLUTION INTRAMUSCULAR; INTRAVENOUS ONCE AS NEEDED
Status: DISCONTINUED | OUTPATIENT
Start: 2024-12-27 | End: 2024-12-27 | Stop reason: HOSPADM

## 2024-12-27 RX ORDER — HYDROMORPHONE HYDROCHLORIDE 1 MG/ML
INJECTION, SOLUTION INTRAMUSCULAR; INTRAVENOUS; SUBCUTANEOUS AS NEEDED
Status: DISCONTINUED | OUTPATIENT
Start: 2024-12-27 | End: 2024-12-27

## 2024-12-27 RX ORDER — ALBUTEROL SULFATE 0.83 MG/ML
2.5 SOLUTION RESPIRATORY (INHALATION) ONCE AS NEEDED
Status: DISCONTINUED | OUTPATIENT
Start: 2024-12-27 | End: 2024-12-27 | Stop reason: HOSPADM

## 2024-12-27 RX ORDER — CEFAZOLIN SODIUM 2 G/100ML
2 INJECTION, SOLUTION INTRAVENOUS ONCE
Status: DISCONTINUED | OUTPATIENT
Start: 2024-12-27 | End: 2024-12-27 | Stop reason: HOSPADM

## 2024-12-27 RX ORDER — NORETHINDRONE AND ETHINYL ESTRADIOL 0.5-0.035
KIT ORAL AS NEEDED
Status: DISCONTINUED | OUTPATIENT
Start: 2024-12-27 | End: 2024-12-27

## 2024-12-27 RX ORDER — ACETAMINOPHEN 325 MG/1
650 TABLET ORAL EVERY 4 HOURS PRN
Status: DISCONTINUED | OUTPATIENT
Start: 2024-12-27 | End: 2024-12-27 | Stop reason: HOSPADM

## 2024-12-27 RX ORDER — PROPOFOL 10 MG/ML
INJECTION, EMULSION INTRAVENOUS AS NEEDED
Status: DISCONTINUED | OUTPATIENT
Start: 2024-12-27 | End: 2024-12-27

## 2024-12-27 RX ORDER — ONDANSETRON HYDROCHLORIDE 2 MG/ML
INJECTION, SOLUTION INTRAVENOUS AS NEEDED
Status: DISCONTINUED | OUTPATIENT
Start: 2024-12-27 | End: 2024-12-27

## 2024-12-27 RX ORDER — CIPROFLOXACIN AND DEXAMETHASONE 3; 1 MG/ML; MG/ML
SUSPENSION/ DROPS AURICULAR (OTIC) AS NEEDED
Status: DISCONTINUED | OUTPATIENT
Start: 2024-12-27 | End: 2024-12-27 | Stop reason: HOSPADM

## 2024-12-27 RX ORDER — LIDOCAINE HYDROCHLORIDE 20 MG/ML
INJECTION, SOLUTION INFILTRATION; PERINEURAL AS NEEDED
Status: DISCONTINUED | OUTPATIENT
Start: 2024-12-27 | End: 2024-12-27

## 2024-12-27 RX ORDER — CEFAZOLIN 1 G/1
INJECTION, POWDER, FOR SOLUTION INTRAVENOUS AS NEEDED
Status: DISCONTINUED | OUTPATIENT
Start: 2024-12-27 | End: 2024-12-27

## 2024-12-27 RX ORDER — NORETHINDRONE AND ETHINYL ESTRADIOL 0.5-0.035
KIT ORAL CONTINUOUS PRN
Status: DISCONTINUED | OUTPATIENT
Start: 2024-12-27 | End: 2024-12-27

## 2024-12-27 RX ORDER — HYDROMORPHONE HYDROCHLORIDE 0.2 MG/ML
0.2 INJECTION INTRAMUSCULAR; INTRAVENOUS; SUBCUTANEOUS EVERY 5 MIN PRN
Status: DISCONTINUED | OUTPATIENT
Start: 2024-12-27 | End: 2024-12-27 | Stop reason: HOSPADM

## 2024-12-27 RX ORDER — EPINEPHRINE 1 MG/ML
INJECTION, SOLUTION, CONCENTRATE INTRAVENOUS AS NEEDED
Status: DISCONTINUED | OUTPATIENT
Start: 2024-12-27 | End: 2024-12-27 | Stop reason: HOSPADM

## 2024-12-27 RX ORDER — PHENYLEPHRINE 10 MG/250 ML(40 MCG/ML)IN 0.9 % SOD.CHLORIDE INTRAVENOUS
CONTINUOUS PRN
Status: DISCONTINUED | OUTPATIENT
Start: 2024-12-27 | End: 2024-12-27

## 2024-12-27 RX ADMIN — HYDROMORPHONE HYDROCHLORIDE 0.2 MG: 0.2 INJECTION, SOLUTION INTRAMUSCULAR; INTRAVENOUS; SUBCUTANEOUS at 16:05

## 2024-12-27 SDOH — HEALTH STABILITY: MENTAL HEALTH: CURRENT SMOKER: 0

## 2024-12-27 ASSESSMENT — PAIN SCALES - GENERAL
PAINLEVEL_OUTOF10: 4
PAINLEVEL_OUTOF10: 5 - MODERATE PAIN
PAINLEVEL_OUTOF10: 4
PAINLEVEL_OUTOF10: 5 - MODERATE PAIN
PAINLEVEL_OUTOF10: 4
PAINLEVEL_OUTOF10: 0 - NO PAIN
PAINLEVEL_OUTOF10: 2
PAINLEVEL_OUTOF10: 4
PAINLEVEL_OUTOF10: 0 - NO PAIN
PAINLEVEL_OUTOF10: 4
PAINLEVEL_OUTOF10: 3
PAINLEVEL_OUTOF10: 4
PAINLEVEL_OUTOF10: 5 - MODERATE PAIN
PAINLEVEL_OUTOF10: 0 - NO PAIN

## 2024-12-27 ASSESSMENT — PAIN - FUNCTIONAL ASSESSMENT

## 2024-12-27 ASSESSMENT — PAIN DESCRIPTION - ORIENTATION: ORIENTATION: LEFT

## 2024-12-27 ASSESSMENT — PAIN DESCRIPTION - DESCRIPTORS: DESCRIPTORS: DULL

## 2024-12-27 ASSESSMENT — PAIN DESCRIPTION - LOCATION: LOCATION: EAR

## 2024-12-27 NOTE — OP NOTE
OPERATIVE NOTE     Date:  2024 OR Location: [facility] OR    Name: Giles Becerra : 1955, Age: 69 y.o., MRN: [id number], Sex: male      Surgeons      Trever Villegas MD    Resident/Fellow/Other Assistant:  Chris Marino MD, Dede Wilson MD    Anesthesia: General  ASA: III  Anesthesia Staff: Anesthesiologist: Sunny Taylor DO  CRNA: Raeann Mendez APRN-CRNA  C-AA: ROBERT Del Castillo  Staff: Circulator: Courtney Ford RN; Meliton Mcmillan RN  Relief Circulator: Dre Gee RN  Relief Scrub: Mona Morales  Scrub Person: Rahel Boykin         Preoperative Diagnosis:  1. Sensorineural Hearing Loss   - Bilateral.    Postoperative Diagnosis:  1. Sensorineural Hearing Loss   - Bilateral.      Procedure Performed:  1.  Cochlear Implantation (18761)   - Left  2. Needle electromyography; cranial nerve supplied muscle(s), unilateral   - Facial nerve.   - Left  3. Microsurgical techniques, requiring use of operating microscope   - Left      Indications:  Giles Becerra is a very pleasant 69 y.o. male who presents with Bilateral severe-to-profound, sensorineural hearing loss and poor discrimination. The patient meets criteria for FDA insertion of cochlear implant. The risks, indications, and complications of surgery were discussed including, but not limited to facial nerve injury, deafness in the operated ear, vertigo, dizziness, imbalance, facial weakness or paralysis, change in sense of taste, perforation of eardrum, pain, bleeding, infection, scarring, need for further surgery, device failure, device extrusion, spinal fluid leak, meningitis, brain damage, brain abscess, stroke, and death. Informed consent was obtained. We also confirmed the patient received the Pneumococcus vaccine prior to surgery. Because of the extensive nature of the dissection and the close proximity of the facial nerve, facial nerve monitoring was used throughout the case.       Operative  Findings:  1. Well aerated mastoid.  2.  stimulator position: under temporalis muscle.  2. Facial nerve intact in normal position.  3. Chorda tympani: sacrificed.   4. Approach: Round window  5. Implant/ electrode: Cochlear  placed.  6. Favorable anatomy.   7. Insertion:   - Very smooth full insertion.  - Speed:  60-90sec.  - Marker: outside RW.   - No resistance.  8. Neural response telemetry: good response.  9. Intraop testing:   - X-ray: Not Performed.   - SmartNav position check: Normal.   - ECOG: Not performed.    - eSRT: Performed.      Operative Technique:   After informed consent was obtained, the patient was taken to the operating room and placed on the operating room table in the supine position. Anesthesia was induced by the anesthesia team without difficulty. Facial nerve monitoring electrodes were placed in the orbicularis oris muscle and orbicularis oculi muscle and the monitor was activated. Lidocaine with epinephrine was injected in the postauricular area of the incision and the patient was then prepped and draped in standard sterile fashion.    The postauricular incision was made down through the skin and soft tissue to the temporalis muscle and the ear was reflected forward in its avascular plane. Bovie cautery was then used to make a cut through musculofascial layers along the linea temporalis and this then was dissected down to the mastoid tip. Lempert periosteal elevator was used to elevate the soft tissues anteriorly, superiorly, and posteriorly, thus exposing the mastoid and the opening of the external auditory canal. We then used the Lempert elevator to elevate the periosteum off of the skull anteriorly for the ground electrode. The Lempert was then used posterior and superior to the mastoid to develop a tight pocket. Muscle from the temporalis muscle was then harvested for use later in the procedure. Dura hooks where used to hold our view.    A standard mastoidectomy was then  completed, carefully identifying the tegmen, sinodural angle and posterior canal wall. We identify the lateral semicircular canal and short process of the incus. Using 3-joselyn fermin, and progressing down to a 2-joselyn fermin we opened the facial recess. We carefully identified the chorda tympani, the incus buttress, and the facial nerve. The facial recess was opened exposing the round window and the rest of the middle ear. The mucosa over the round window niche was elevated using the right angle. The round window niche was identified and its lip was drilled to have a 360-degree view of it. Decadron was then injected into the middle ear.    A 2 mm cutting drill was used to created holes at the lateral edge of the mastoidectomy to secure the internal  in place. The ear was copiously irrigated with saline.  The cochlear implant was then brought onto the operative field and placed in our subperiosteal pocket. The cochlear implant was then secured in its position with Vycril 3-0, tagging the opening of the subperiosteal pocket down to bone.    Copious irrigation was then performed and the middle ear was again filled with Decadron. At this point, we performed the opening of the round window with a small right angle. Using microsuction and the jeweler's forceps, we inserted the cochlear implant electrode without resistance using off-sheath technique. The electrode was advanced slowly passed the marker on the array, and a pull-back technique was used. The insertion was very smooth. The insertion site was then packed with muscle, leaving the electrode lead inferiorly in the facial recess. The electrode was then carefully coiled back into the mastoid and a piece of Gelfoam was placed over the mastoid to protect the electrodes during closure. The middle ear was further injected with Decadron.    The wound was then closed in 3 layers using a 3-0 Vicryl for the muscle, 4-0 Vicryl for the subcuticular stitches and 5-0  fast gut suture for the dermis. During this time, the audiologist was here to induct neural response telemetry of the cochlear implant and got good responses. eSRT and SmartNav were performed. At the conclusion of this procedure the incision was covered with a sterile dressing and the draping was removed. A Rodger dressing was applied to the patient's ear. The patient's care was returned to anesthesia for awakening.    This completed the procedure. The patient was then emerged from anesthesia and extubated without difficulty. The patient was then transported back to PACU. There were no apparent complications. Following the procedure, the findings were discussed with the patient's family and all of their questions were answered.    Attending Attestation: I was present and scrubbed for the entire procedure.        Implants:   Implant Name Type Inv. Item Serial No.  Lot No. LRB No. Used Action   COCHLEAR, NUCLEUS , PROFILE PLUS W/SLIM MODIOLAR ELECTRODE - [id number] - [id number] ENT Implant COCHLEAR, NUCLEUS , PROFILE PLUS W/SLIM MODIOLAR ELECTRODE [id number] [employer]  Left 1 Implanted     Specimens: No specimens collected  Estimated Blood Loss: Minimal  Complications: none  Condition of the patient: Stable  Disposition: PACU    ____________________________________________________  Trever Velazquez MD  Professor and Chief   Otology/Neurotology/Lateral Skull-Base Surgery   [facility]  Phone: [phone]  Fax: [phone]

## 2024-12-27 NOTE — H&P
"      Subjective   Giles Becerra is a 69 y.o. male with history of open heart surgery and severe bilateral sensory hearing loss that is worse in right compared to left. Meets FDA criteria for bilateral  cochlear implantation. He continues to use of hearing aid in the left ear and is post right-sided cochlear implantation with a  electrode.    He continues to have poor speech understanding. He is interested in contralateral implantation.   The risks, indications, and complications of doing a left-sided cochlear implantation was discussed with the patient.  He elected to proceed.    Allergies:  Patient has no known allergies.    Review of Systems:   A comprehensive 10-point review of systems was obtained including constitutional, neurological, HEENT, pulmonary, cardiovascular, genito-urinary, and other pertinent systems and was negative except as noted in the HPI.        Physical Exam:  Last Recorded Vitals: Blood pressure 128/74, pulse 70, resp. rate 16, height 1.778 m (5' 10\"), weight 100 kg (221 lb 1.9 oz), SpO2 97%.    On physical exam, the patient is a well-nourished, well-developed patient, in no acute distress, able to communicate without assistance in English language. Head and face is atraumatic and normocephalic. Salivary glands are intact. Facial strength is symmetrical bilaterally.       On ear examination:  Right ear: The patient has an open and patent ear canal. The tympanic membrane is intact.  Cannot discern sound  Left ear: The patient has an open and patent ear canal. The tympanic membrane is intact.  Cannot discern sound      On neuro exam, the patient is alert and oriented x3, cranial nerves are grossly intact, cerebellar exam is normal.      The rest of the exam, including anterior rhinoscopy, oropharyngeal exam, neck exam, and cardiovascular exam, were normal including no palpable lymphadenopathies, thyroid in the midline position, normal pulses, and normal chest " excursion.    Assessment/Plan   Giles Becerra is a 69 y.o. male with history of open heart surgery and severe bilateral sensory hearing loss that is worse in right compared to left. Meets FDA criteria for bilateral  cochlear implantation. He continues to use of hearing aid in the left ear and is post right-sided cochlear implantation with a  electrode.    He continues to have poor speech understanding. He is interested in contralateral implantation.   The risks, indications, and complications of doing a left-sided cochlear implantation was discussed with the patient.  He elected to proceed.

## 2024-12-27 NOTE — ANESTHESIA POSTPROCEDURE EVALUATION
Patient: Giles Becerra    Procedure Summary       Date: 12/27/24 Room / Location: [facility] OR 04 / Virtual [facility] [place] OR    Anesthesia Start: 0953 Anesthesia Stop: 1438    Procedure: Insertion Cochlear Implant (Left) Diagnosis:       Sensorineural hearing loss (SNHL) of both ears      Impairment of auditory discrimination of both ears      (Sensorineural hearing loss (SNHL) of both ears [H90.3])      (Impairment of auditory discrimination of both ears [H93.293])    Surgeons: Trever Villegas MD Responsible Provider: Sunny Taylor DO    Anesthesia Type: general ASA Status: 3            Anesthesia Type: general    Vitals Value Taken Time   /91 12/27/24 1447   Temp 36.1 °C (97 °F) 12/27/24 1423   Pulse 82 12/27/24 1456   Resp 15 12/27/24 1456   SpO2 91 % 12/27/24 1456   Vitals shown include unfiled device data.    Anesthesia Post Evaluation    Patient location during evaluation: PACU  Patient participation: complete - patient participated  Level of consciousness: awake  Pain management: adequate  Multimodal analgesia pain management approach  Airway patency: patent  Two or more strategies used to mitigate risk of obstructive sleep apnea  Cardiovascular status: acceptable  Respiratory status: face mask  Hydration status: acceptable  Postoperative Nausea and Vomiting: none        There were no known notable events for this encounter.

## 2024-12-27 NOTE — ANESTHESIA PROCEDURE NOTES
Airway  Date/Time: 12/27/2024 10:09 AM  Urgency: elective    Airway not difficult    Staffing  Performed: CRNA   Authorized by: Sunny Taylor DO    Performed by: HAYES Yates-CRNA  Patient location during procedure: OR    Indications and Patient Condition  Indications for airway management: anesthesia  Spontaneous ventilation: present  Sedation level: deep  Preoxygenated: yes  Patient position: sniffing  Mask difficulty assessment: 2 - vent by mask + OA or adjuvant +/- NMBA  Planned trial extubation    Final Airway Details  Final airway type: endotracheal airway      Successful airway: ETT  Cuffed: yes   Successful intubation technique: direct laryngoscopy  Endotracheal tube insertion site: oral  Blade: João  Blade size: #4  ETT size (mm): 7.5  Cormack-Lehane Classification: grade I - full view of glottis  Placement verified by: chest auscultation and capnometry   Measured from: lips  ETT to lips (cm): 24  Number of attempts at approach: 1

## 2024-12-27 NOTE — PROGRESS NOTES
Cochlear Implant Intra-Operative Monitoring     Date: 12/27/24   Surgeon: Dr. Trever Velazquez MD  Audiology Extern: MARIO Henderson       Implanted Ear: Left  : [employer]chle[employer]  Implant:   Serial Number: [id number]  Implant Name Type Inv. Item Serial No.  Lot No. LRB No. Used Action   COCHLEAR, NUCLEUS , PROFILE PLUS W/SLIM MODIOLAR ELECTRODE - [id number] - [id number] ENT Implant COCHLEAR, NUCLEUS , PROFILE PLUS W/SLIM MODIOLAR ELECTRODE [id number] [employer]  Left 1 Implanted        Electrode Insertion Achieved: Full    Placement Check: Within normal limits    Impedances:  Impedances were measured on electrodes 1-22 and within normal limits.     eSRT: ESRTs were obtained using a pulse width of 50 and a stimulation rate of 900 Hz.  Responses were obtained at electrodes 1, 6, 12, 17, and 22.        NRTs: Completed on all electrodes, see below.        ECoG: Intracochlear electrocochleography was not clinically indicated.           Equipment and Forms:   The patient's equipment backpack was provided to the family. They were counseled to bring the backpack to activation. Post-operative course was reviewed with the family. The patient is instructed to stop using their hearing aid on the newly implanted ear. They are encouraged to continue using their amplification on the other ear.    The family was provided with the internal implant guide, MRI compatibility booklet, and implant identification card specific to the patient's implant. The patient should place this in their wallet. The family was counseled that the internal implant was registered; indicating the start of the 10 year 's warranty. The external equipment, located in the backpack provided, will be registered at the activation; indicating the start of the 5 year 's warranty on the processor.     Post operative questions should be guided  to the Patient Navigator Cochlear Implant Program, Sharlene Josue RN at [phone].      Appointments  This patient was referred to [facility] for cochlear implantation surgery via a CPN clinic. Their activation and follow up programming will be completed at their referring clinic. The patient should contact their audiologist to schedule activation appointments.       Intraoperative testing was completed by MARIO Singer. Reviewed by Dr. Werner Cotton, Melquiades CCC-A.

## 2025-01-09 NOTE — PROGRESS NOTES
Reason for Consult:  Post-op visit     Subjective   History Of Present Illness:  Giles Becerra is a 69 y.o. male with history of open heart surgery and severe bilateral sensory hearing loss that was worse in right compared to left. He met FDA criteria for bilateral  cochlear implantation.      He underwent right-sided cochlear implantation with a  electrode on 08/2024.  He had significant improvement in speech understanding.     His hearing on the left dropped further and for that reason I took the patient to the operating room on 12/27/24 and performed a left sequential cochlear implantation.     During surgery we encountered:  1. Well aerated mastoid.  2.  stimulator position: under temporalis muscle.  2. Facial nerve intact in normal position.  3. Chorda tympani: sacrificed.   4. Approach: Round window  5. Implant/ electrode: Cochlear  placed.  6. Favorable anatomy.   7. Insertion:   - Very smooth full insertion.  - Speed:  60-90sec.  - Marker: outside RW.   - No resistance.  8. Neural response telemetry: good response.  9. Intraop testing:              - X-ray: Not Performed.              - SmartNav position check: Normal.              - ECOG: Not performed.               - eSRT: Performed.    Since surgery the patient has been doing well and has not had any major problems.     Past Medical History:  He has a past medical history of Arthritis, CHF (congestive heart failure), Chronic bronchitis (Multi), Coronary artery disease, HL (hearing loss) (1955), Hyperlipidemia, Hypertension, Hypothyroidism, and Vision loss.    Surgical History:  He has a past surgical history that includes Tonsillectomy (01/15/1965); Coronary artery bypass graft; and Finger surgery.     Social History:  He reports that he has never smoked. He has never used smokeless tobacco. He reports that he does not drink alcohol and does not use drugs.    Family History:  Family history is unknown by patient.      Medications:  Current Outpatient Medications   Medication Instructions    acetaminophen (TYLENOL) 650 mg, oral, Every 6 hours PRN    amLODIPine (Norvasc) 10 mg tablet     aspirin 81 mg, Daily    atorvastatin (Lipitor) 40 mg tablet     benazepril (Lotensin) 40 mg tablet     carvedilol (Coreg) 6.25 mg tablet     docusate sodium (COLACE) 100 mg, oral, 2 times daily PRN    ibuprofen 600 mg, oral, Every 6 hours PRN    levothyroxine (Synthroid, Levoxyl) 112 mcg tablet Take by mouth.    nitroglycerin (Nitrostat) 0.4 mg SL tablet     tamsulosin (Flomax) 0.4 mg 24 hr capsule       Allergies:  Patient has no known allergies.    Review of Systems:   A comprehensive 10-point review of systems was obtained including constitutional, neurological, HEENT, pulmonary, cardiovascular, genito-urinary, and other pertinent systems and was negative except as noted in the HPI.      Objective   Physical Exam:  Last Recorded Vitals: There were no vitals taken for this visit.    On physical exam, the patient is a well-nourished, well-developed patient, in no acute distress, able to communicate without assistance in English language. Head and face is atraumatic and normocephalic. Salivary glands are intact. Facial strength is symmetrical bilaterally.       On ear examination:  Right ear: The patient has an open and patent ear canal. The tympanic membrane is intact. The incision looks clear dry and intact. CI magnet in perfect position and site intact.  He cannot discern sound  Left ear: The patient has an open and patent ear canal. The tympanic membrane is intact with hemotympanum. The incision looks clear dry and intact. CI magnet in perfect position and site intact.  AC>BC    On vestibular exam, the patient has no spontaneous nystagmus, no headshake nystagmus, no head-thrust nystagmus, and no nystagmus on hyperventilation or Valsalva maneuvers. [place]-Hallpike maneuver is negative bilaterally.       On neuro exam, the patient is alert and  oriented x3, cranial nerves are grossly intact, cerebellar exam is normal.      The rest of the exam, including anterior rhinoscopy, oropharyngeal exam, neck exam, and cardiovascular exam, were normal including no palpable lymphadenopathies, thyroid in the midline position, normal pulses, and normal chest excursion.       Reviewed Results:  Audiology Testing:  I personally reviewed the CI evaluation from [facility] from  06/2024 that showed:            Right ear: 0% CNC in quiet, 0% AzBio in quiet.    Left ear: 28% CNC in quiet, 28% AzBio in quiet, 15% AzBio +10 SNR.                   Imaging:  I personally reviewed his CT IAC from 07/2024:  Patent cochlea bilaterally and normal positioning of the facial nerve     I personally reviewed his MRI IAC from 07/2024:  No retrocochlear pathology.      Procedure:  None    Assessment/Plan     1. Sensorineural hearing loss (SNHL) of both ears    2. Cochlear implant status        In summary, Giles Becerra is a 69 y.o. male with history of open heart surgery and severe bilateral sensory hearing loss that was worse in right compared to left. He met FDA criteria for bilateral  cochlear implantation.      He underwent right-sided cochlear implantation with a  electrode on 08/2024.  He had significant improvement in speech understanding.     His hearing on the left dropped further and for that reason I took the patient to the operating room on 12/27/24 and performed a left sequential cochlear implantation with a  electrode.    Since surgery he has been doing well and has not had any major problems.  He has some residual hearing on the left. He is ready to be activated.     Follow-up in 6 months.         ____________________________________________________  Trever Velazquez MD  Professor and Chief   Otology/Neurotology/Lateral Skull-Base Surgery   [facility]      Scribe Attestation  By signing my name below, Nirav TORRES  Justin Nguyen   attest that this documentation has been prepared under the direction and in the presence of Trever Villegas MD.

## 2025-01-10 ENCOUNTER — APPOINTMENT (OUTPATIENT)
Dept: OTOLARYNGOLOGY | Facility: CLINIC | Age: 70
End: 2025-01-10
Payer: COMMERCIAL

## 2025-01-10 DIAGNOSIS — Z96.21 COCHLEAR IMPLANT STATUS: ICD-10-CM

## 2025-01-10 DIAGNOSIS — H90.3 SENSORINEURAL HEARING LOSS (SNHL) OF BOTH EARS: Primary | ICD-10-CM

## 2025-01-10 PROCEDURE — 99024 POSTOP FOLLOW-UP VISIT: CPT | Performed by: OTOLARYNGOLOGY

## 2025-01-10 PROCEDURE — 1159F MED LIST DOCD IN RCRD: CPT | Performed by: OTOLARYNGOLOGY

## 2025-01-10 PROCEDURE — 1160F RVW MEDS BY RX/DR IN RCRD: CPT | Performed by: OTOLARYNGOLOGY

## 2025-01-10 NOTE — LETTER
January 13, 2025     Melquiades Phillips    Patient: Giles Becerra   YOB: 1955   Date of Visit: 1/10/2025       Dear Melquiades Avina:    Thank you for referring Giles Becerra to me for evaluation. Below are my notes for this consultation.  If you have questions, please do not hesitate to call me. I look forward to following your patient along with you.       Sincerely,     Trever Villegas MD      CC: Melquiades Hernandez MD  ______________________________________________________________________________________            Reason for Consult:  Post-op visit     Subjective  History Of Present Illness:  Giles Becerra is a 69 y.o. male with history of open heart surgery and severe bilateral sensory hearing loss that was worse in right compared to left. He met FDA criteria for bilateral  cochlear implantation.      He underwent right-sided cochlear implantation with a  electrode on 08/2024.  He had significant improvement in speech understanding.     His hearing on the left dropped further and for that reason I took the patient to the operating room on 12/27/24 and performed a left sequential cochlear implantation.     During surgery we encountered:  1. Well aerated mastoid.  2.  stimulator position: under temporalis muscle.  2. Facial nerve intact in normal position.  3. Chorda tympani: sacrificed.   4. Approach: Round window  5. Implant/ electrode: Cochlear  placed.  6. Favorable anatomy.   7. Insertion:   - Very smooth full insertion.  - Speed:  60-90sec.  - Marker: outside RW.   - No resistance.  8. Neural response telemetry: good response.  9. Intraop testing:              - X-ray: Not Performed.              - SmartNav position check: Normal.              - ECOG: Not performed.               - eSRT: Performed.    Since surgery the patient has been doing well and has not had any major problems.     Past Medical History:  He has a past medical  history of Arthritis, CHF (congestive heart failure), Chronic bronchitis (Multi), Coronary artery disease, HL (hearing loss) (1955), Hyperlipidemia, Hypertension, Hypothyroidism, and Vision loss.    Surgical History:  He has a past surgical history that includes Tonsillectomy (01/15/1965); Coronary artery bypass graft; and Finger surgery.     Social History:  He reports that he has never smoked. He has never used smokeless tobacco. He reports that he does not drink alcohol and does not use drugs.    Family History:  Family history is unknown by patient.     Medications:  Current Outpatient Medications   Medication Instructions   • acetaminophen (TYLENOL) 650 mg, oral, Every 6 hours PRN   • amLODIPine (Norvasc) 10 mg tablet    • aspirin 81 mg, Daily   • atorvastatin (Lipitor) 40 mg tablet    • benazepril (Lotensin) 40 mg tablet    • carvedilol (Coreg) 6.25 mg tablet    • docusate sodium (COLACE) 100 mg, oral, 2 times daily PRN   • ibuprofen 600 mg, oral, Every 6 hours PRN   • levothyroxine (Synthroid, Levoxyl) 112 mcg tablet Take by mouth.   • nitroglycerin (Nitrostat) 0.4 mg SL tablet    • tamsulosin (Flomax) 0.4 mg 24 hr capsule       Allergies:  Patient has no known allergies.    Review of Systems:   A comprehensive 10-point review of systems was obtained including constitutional, neurological, HEENT, pulmonary, cardiovascular, genito-urinary, and other pertinent systems and was negative except as noted in the HPI.      Objective  Physical Exam:  Last Recorded Vitals: There were no vitals taken for this visit.    On physical exam, the patient is a well-nourished, well-developed patient, in no acute distress, able to communicate without assistance in English language. Head and face is atraumatic and normocephalic. Salivary glands are intact. Facial strength is symmetrical bilaterally.       On ear examination:  Right ear: The patient has an open and patent ear canal. The tympanic membrane is intact. The  incision looks clear dry and intact. CI magnet in perfect position and site intact.  He cannot discern sound  Left ear: The patient has an open and patent ear canal. The tympanic membrane is intact with hemotympanum. The incision looks clear dry and intact. CI magnet in perfect position and site intact.  AC>BC    On vestibular exam, the patient has no spontaneous nystagmus, no headshake nystagmus, no head-thrust nystagmus, and no nystagmus on hyperventilation or Valsalva maneuvers. [place]-Hallpike maneuver is negative bilaterally.       On neuro exam, the patient is alert and oriented x3, cranial nerves are grossly intact, cerebellar exam is normal.      The rest of the exam, including anterior rhinoscopy, oropharyngeal exam, neck exam, and cardiovascular exam, were normal including no palpable lymphadenopathies, thyroid in the midline position, normal pulses, and normal chest excursion.       Reviewed Results:  Audiology Testing:  I personally reviewed the CI evaluation from [facility] from  06/2024 that showed:            Right ear: 0% CNC in quiet, 0% AzBio in quiet.    Left ear: 28% CNC in quiet, 28% AzBio in quiet, 15% AzBio +10 SNR.                   Imaging:  I personally reviewed his CT IAC from 07/2024:  Patent cochlea bilaterally and normal positioning of the facial nerve     I personally reviewed his MRI IAC from 07/2024:  No retrocochlear pathology.      Procedure:  None    Assessment/Plan    1. Sensorineural hearing loss (SNHL) of both ears    2. Cochlear implant status        In summary, Giles Becerra is a 69 y.o. male with history of open heart surgery and severe bilateral sensory hearing loss that was worse in right compared to left. He met FDA criteria for bilateral  cochlear implantation.      He underwent right-sided cochlear implantation with a  electrode on 08/2024.  He had significant improvement in speech understanding.     His hearing on the left dropped further and for  that reason I took the patient to the operating room on 12/27/24 and performed a left sequential cochlear implantation with a  electrode.    Since surgery he has been doing well and has not had any major problems.  He has some residual hearing on the left. He is ready to be activated.     Follow-up in 6 months.         ____________________________________________________  Trever Velazquez MD  Professor and Chief   Otology/Neurotology/Lateral Skull-Base Surgery   [facility]      Scribe Attestation  By signing my name below, I, Nirav Nguyen, Scribe   attest that this documentation has been prepared under the direction and in the presence of Trever Villegas MD.

## 2025-05-16 NOTE — PROGRESS NOTES
COCHLEAR IMPLANT (CI) PROGRAMMING and FUNCTIONAL TESTING         Name:  Giles Becerra  :  1955  Age:  69 y.o.  Date of Evaluation:  2025     RIGHT DEVICE  External Device: Cochlear [id number] (N8) sound processor  Internal Device: Cochlear [employer]    Surgeon: Trever Velazquez MD  Implantation Date: 2024  Activation Date: 2024 at [facility]    LEFT DEVICE  External Device: Cochlear [id number] (N8) sound processor  Internal Device: Cochlear [employer]    Surgeon: Trever Velazquez MD  Implantation Date: 2024  Activation Date: 2025 at [facility]    HISTORY  Giles Becerra arrives today for programming and optimization of the bilateral cochlear implants. The patient reports hearing many sounds with his cochlear implants and can hear sound at a distance that he couldn't before his cochlear implants. He was managed at [facility] and comes in today to review his programming and outcomes. He reports hearing loss since premature, low birth weight and NICU as a . He was unaided until he was 6 years old when he began wearing hearing aids. He uses spoken language to communicate but does use speech reading and knows American Sign Language. He arrives on P1 in the left ear and P2 in the right ear, volume 3 bilaterally. He is unsure how this happened.    Recall, Giles Becerra has a documented history of profound sensorineural hearing loss, bilaterally.    RIGHT PROGRAMMING  Headset pressure, magnet strength (3I), and incision site were checked with no problems noted. Datalogging revealed 14+ hours of use per day with 5+ hours in speech, on average since the last appointment.    Electrode impedances, used to monitor internal device function, were measured across the electrode array.  Impedance measures were within normal limits for all electrodes, in each stimulation mode. Programming began from MAP 29.  Current parameters of MP1+2 stimulation mode, an ACE speech  processing strategy, 8 maxima, and a 900 Hz stimulation rate were maintained.  Neural Response telemetry (NRT) is a measurement of auditory nerve synchronous response to electrical stimulation. NRT was completed on a variety of channels. Electrical stapedial reflex thresholds (eSRT) are a measurement of the contraction of the stapedius muscle within the middle ear space from an electrical pulse from the cochlear implant intra-cochlear electrode. ESRT was obtained with the probe in the contralateral ear on a variety of channels. No eSRT was obtained at patients uncomfortable loudness level. Changed PW to 37. Threshold (T) levels were measuring using up-down bracketing method on a variety of channels with the remaining channels interpolated. Comfort (C) levels were measuring using psychophysical loudness scaling method on a variety of channels with the remaining channels interpolated.  Disabled channels 1 and 2 due to poor sound quality and non-auditory perception. Went on for live speech, reports speech is too loud and uncomfortable, decreased T and C levels 10 CL. C levels were swept for loudness balancing. No facial stimulation or non-auditory stimulation observed during live speech. Current programming consists of:  PROGRAM MAP SMARTSOUND WNR+SNR MICROPHONE VOLUME SENSITIVITY COMMENTS   1 MAP 33 SCAN2 Enabled Standard  Fixed  Adaptive 6 10 New program   2 MAP 29 SCAN2 Enabled Standard  Fixed  Adaptive 6 10 Old program   3 MAP 33 ADRO+Whisper Enabled Standard 6 12 MUSIC - new program     LEFT PROGRAMMING  Headset pressure, magnet strength (3I), and incision site were checked with no problems noted. Datalogging revealed 14+ hours of use per day with 5+ hours in speech, on average since the last appointment.    Electrode impedances, used to monitor internal device function, were measured across the electrode array.  Impedance measures were within normal limits for all electrodes, in each stimulation mode.  Programming began from MAP 29.  Current parameters of MP1+2 stimulation mode, an ACE speech processing strategy, 8 maxima, and a 900 Hz stimulation rate were maintained.  Neural Response telemetry (NRT) is a measurement of auditory nerve synchronous response to electrical stimulation. NRT was completed on a variety of channels. Electrical stapedial reflex thresholds (eSRT) are a measurement of the contraction of the stapedius muscle within the middle ear space from an electrical pulse from the cochlear implant intra-cochlear electrode. ESRT was obtained with the probe in the contralateral ear on a variety of channels. No eSRT was obtained at patients uncomfortable loudness level. Changed PW to 37. Threshold (T) levels were measuring using up-down bracketing method on a variety of channels with the remaining channels interpolated. Comfort (C) levels were measuring using psychophysical loudness scaling method on a variety of channels with the remaining channels interpolated.  Disabled channels 1 and 2 due to poor sound quality and non-auditory perception. C levels were swept for loudness balancing. No facial stimulation or non-auditory stimulation observed during live speech. Current programming consists of:  PROGRAM MAP SMARTSOUND WNR+SNR MICROPHONE VOLUME SENSITIVITY COMMENTS   1 MAP 34 SCAN2 Enabled Standard  Fixed  Adaptive 6 10 New program   2 MAP 30 SCAN2 Enabled Standard  Fixed  Adaptive 6 10 Old program   3 MAP 34 ADRO+Whisper Enabled Standard 6 12 MUSIC - new program     FUNCTIONAL TESTING  Testing prior to programming at user settings (P1/V3/S12)    All testing was completed in the soundfield at 0 degrees azimuth. Pure tone testing was obtained using pulsed frequency modulating (FM) stimuli. Sentence and word recognition testing was performed with recorded material at 60 dBSPL.    LISTENING CONDITION Aided thresholds 250-6000 Hz CNC Words  CNC Phonemes AzBio in quiet    Right CI only 30-50 dB HL 0% 28%  DNT   Left CI only 25-60 dB HL 16% 40% DNT     UNAIDED TESTING (puretone audiometry only 125-8000 Hz)  RIGHT: Profound sensorineural hearing loss.  LEFT: Profound sensorineural hearing loss.    IMPRESSIONS  Positive stimulation on all active electrodes, bilaterally.  A reliable psychophysical MAP was defined in the ACE speech processing strategy, bilaterally. Obtained NRT bilaterally. Attempted to obtain eSRT bilaterally with a PW50 with no consistent response observed. Changed PW to 37 today and measured T and C levels. Disabled channels 1 and 2 bilaterally due to sensitivity and poor sound quality.     Discussed reasonable expectations of CI as well as adapting to new environmental sounds. Only use P1, if he can not adapt to the changes made, change back to P2 which is the old programming. Doing a great job wearing the device and spending time in speech.    RECOMMENDATIONS  1. Continue medical follow-up with your neuro-otologist (Dr. Schilling, Dr. Velazquez, or Dr. Solano)  2. Utilize the Cochlear [employer] [employer] Cochlear [id number] (N8) sound processors daily using the most tolerated program.  3. Return in 2 months to check programming, optimization, and functional testing.   4. Utilize aural rehabilitation/auditory training programs, books on tape/audiobooks/podcasts, and written materials at home to improve speech understanding ability.  5. Communication strategies were discussed (utilizing visual cues/gestures; reducing background noise and distance from desired source; increasing light to assist with visual cues; use of clear speech).  6. Contact [facility] CI Team auditory-verbal therapist to initiate aural rehabilitation therapy.    RUBA Chang, CCC-A  Senior Clinical Audiologist    TIME: [phone]

## 2025-05-22 ENCOUNTER — APPOINTMENT (OUTPATIENT)
Dept: AUDIOLOGY | Facility: CLINIC | Age: 70
End: 2025-05-22
Payer: COMMERCIAL

## 2025-05-22 DIAGNOSIS — Z96.21 COCHLEAR IMPLANT STATUS: ICD-10-CM

## 2025-05-22 DIAGNOSIS — H90.3 SENSORINEURAL HEARING LOSS (SNHL) OF BOTH EARS: Primary | ICD-10-CM

## 2025-07-11 ENCOUNTER — APPOINTMENT (OUTPATIENT)
Dept: OTOLARYNGOLOGY | Facility: CLINIC | Age: 70
End: 2025-07-11
Payer: COMMERCIAL

## 2025-07-11 DIAGNOSIS — Z96.21 COCHLEAR IMPLANT STATUS: ICD-10-CM

## 2025-07-11 DIAGNOSIS — H90.3 SENSORINEURAL HEARING LOSS (SNHL) OF BOTH EARS: Primary | ICD-10-CM

## 2025-07-11 PROCEDURE — 1159F MED LIST DOCD IN RCRD: CPT | Performed by: OTOLARYNGOLOGY

## 2025-07-11 PROCEDURE — 1036F TOBACCO NON-USER: CPT | Performed by: OTOLARYNGOLOGY

## 2025-07-11 PROCEDURE — 99213 OFFICE O/P EST LOW 20 MIN: CPT | Performed by: OTOLARYNGOLOGY

## 2025-07-11 PROCEDURE — 1160F RVW MEDS BY RX/DR IN RCRD: CPT | Performed by: OTOLARYNGOLOGY

## 2025-07-11 NOTE — LETTER
July 11, 2025     Cory Hoffman MD  [address] [place]  [facility] [address]    Patient: Giles Becerra   YOB: 1955   Date of Visit: 7/11/2025       Dear Dr. Cory Hoffman MD:    Thank you for referring Giles Becerra to me for evaluation. Below are my notes for this consultation.  If you have questions, please do not hesitate to call me. I look forward to following your patient along with you.       Sincerely,     Trever Villegas MD      CC: RUBA Chang, CCC-A  Ruba Tate  ______________________________________________________________________________________            Reason for Consult:  Post-op visit     Subjective  History Of Present Illness:  Giles Becerra is a 69 y.o. male with history of open heart surgery and severe bilateral sensory hearing loss since childhood that was worse in right compared to left. He met FDA criteria for bilateral  cochlear implantation.      He underwent right-sided cochlear implantation with a  electrode on 08/2024.  He had significant improvement in speech understanding.     His hearing on the left dropped further and for that reason I took the patient to the operating room on 12/27/24 and performed a left sequential cochlear implantation.     During surgery we encountered:  1. Well aerated mastoid.  2.  stimulator position: under temporalis muscle.  2. Facial nerve intact in normal position.  3. Chorda tympani: sacrificed.   4. Approach: Round window  5. Implant/ electrode: Cochlear  placed.  6. Favorable anatomy.   7. Insertion:   - Very smooth full insertion.  - Speed:  60-90sec.  - Marker: outside RW.   - No resistance.  8. Neural response telemetry: good response.  9. Intraop testing:              - X-ray: Not Performed.              - SmartNav position check: Normal.              - ECOG: Not performed.               - eSRT: Performed.    Since surgery, he's been doing well. He's had a very slow  improvement of his speech understanding. We discussed his results with [facility], and they agreed that we would try to do his programming.     Since then, he has seen Isabela Mendieta, who made some changes in May and he feels that his speech understanding is improving.      Past Medical History:  He has a past medical history of Arthritis, CHF (congestive heart failure), Chronic bronchitis (Multi), Coronary artery disease, Hearing aid worn, HL (hearing loss) (1955), Hyperlipidemia, Hypertension, Hypothyroidism, and Vision loss.    Surgical History:  He has a past surgical history that includes Tonsillectomy (01/15/1965); Coronary artery bypass graft; Finger surgery; Cochlear implant; Cholecystectomy; and Foot surgery.     Social History:  He reports that he has never smoked. He has never used smokeless tobacco. He reports that he does not drink alcohol and does not use drugs.    Family History:  Family history is unknown by patient.     Medications:  Current Outpatient Medications   Medication Instructions   • acetaminophen (TYLENOL) 650 mg, oral, Every 6 hours PRN   • amLODIPine (Norvasc) 10 mg tablet    • aspirin 81 mg, Daily   • atorvastatin (Lipitor) 40 mg tablet    • benazepril (Lotensin) 40 mg tablet    • carvedilol (Coreg) 6.25 mg tablet    • docusate sodium (COLACE) 100 mg, oral, 2 times daily PRN   • ibuprofen 600 mg, oral, Every 6 hours PRN   • levothyroxine (Synthroid, Levoxyl) 112 mcg tablet Take by mouth.   • nitroglycerin (Nitrostat) 0.4 mg SL tablet    • tamsulosin (Flomax) 0.4 mg 24 hr capsule       Allergies:  Patient has no known allergies.    Review of Systems:   A comprehensive 10-point review of systems was obtained including constitutional, neurological, HEENT, pulmonary, cardiovascular, genito-urinary, and other pertinent systems and was negative except as noted in the HPI.      Objective  Physical Exam:  Last Recorded Vitals: There were no vitals taken for this visit.    On  physical exam, the patient is a well-nourished, well-developed patient, in no acute distress, able to communicate without assistance in English language. Head and face is atraumatic and normocephalic. Salivary glands are intact. Facial strength is symmetrical bilaterally.       On ear examination:  Right ear: The patient has an open and patent ear canal. The tympanic membrane is intact. The incision looks clear dry and intact. CI magnet in perfect position and site intact.  He cannot discern sound  Left ear: The patient has an open and patent ear canal. The tympanic membrane is intact with hemotympanum. The incision looks clear dry and intact. CI magnet in perfect position and site intact.  AC>BC    On vestibular exam, the patient has no spontaneous nystagmus, no headshake nystagmus, no head-thrust nystagmus, and no nystagmus on hyperventilation or Valsalva maneuvers. Heidy-Hallpike maneuver is negative bilaterally.       On neuro exam, the patient is alert and oriented x3, cranial nerves are grossly intact, cerebellar exam is normal.      The rest of the exam, including anterior rhinoscopy, oropharyngeal exam, neck exam, and cardiovascular exam, were normal including no palpable lymphadenopathies, thyroid in the midline position, normal pulses, and normal chest excursion.       Reviewed Results:  Audiology Testing:  I review his programming from May of 2025 that showed, on the right CNC of 0 and on the left, CNC of 16%.      I personally reviewed the CI evaluation from [facility] from  06/2024 that showed:            Right ear: 0% CNC in quiet, 0% AzBio in quiet.    Left ear: 28% CNC in quiet, 28% AzBio in quiet, 15% AzBio +10 SNR.                   Imaging:  I personally reviewed his CT IAC from 07/2024:  Patent cochlea bilaterally and normal positioning of the facial nerve     I personally reviewed his MRI IAC from 07/2024:  No retrocochlear pathology.      Procedure:  None    Assessment/Plan    1.  Sensorineural hearing loss (SNHL) of both ears    2. Cochlear implant status      In summary, Giles Becerra is a 69 y.o. male with history of open heart surgery and severe bilateral sensory hearing loss that was worse in right compared to left. He met FDA criteria for bilateral  cochlear implantation.      He underwent right-sided cochlear implantation with a  electrode on 08/2024.  He had significant improvement in speech understanding.     His hearing on the left dropped further and for that reason I took the patient to the operating room on 12/27/24 and performed a left sequential cochlear implantation with a  electrode.    Since surgery he has been doing well. He recently was reprogrammed by Lenny Jackson. He feels that he's speech understanding is improving and he's scheduled to have a programming session in July.     I will see him back in 6 months.        ____________________________________________________  Trever Velazquez MD  Professor and Chief   Otology/Neurotology/Lateral Skull-Base Surgery   [facility]    Scribe Attestation  By signing my name below, I, Freya Dudley , Scribe   attest that this documentation has been prepared under the direction and in the presence of Trever Villegas MD.

## 2025-07-11 NOTE — PROGRESS NOTES
Reason for Consult:  Post-op visit     Subjective   History Of Present Illness:  Giles Becerra is a 69 y.o. male with history of open heart surgery and severe bilateral sensory hearing loss since childhood that was worse in right compared to left. He met FDA criteria for bilateral  cochlear implantation.      He underwent right-sided cochlear implantation with a  electrode on 08/2024.  He had significant improvement in speech understanding.     His hearing on the left dropped further and for that reason I took the patient to the operating room on 12/27/24 and performed a left sequential cochlear implantation.     During surgery we encountered:  1. Well aerated mastoid.  2.  stimulator position: under temporalis muscle.  2. Facial nerve intact in normal position.  3. Chorda tympani: sacrificed.   4. Approach: Round window  5. Implant/ electrode: Cochlear  placed.  6. Favorable anatomy.   7. Insertion:   - Very smooth full insertion.  - Speed:  60-90sec.  - Marker: outside RW.   - No resistance.  8. Neural response telemetry: good response.  9. Intraop testing:              - X-ray: Not Performed.              - SmartNav position check: Normal.              - ECOG: Not performed.               - eSRT: Performed.    Since surgery, he's been doing well. He's had a very slow improvement of his speech understanding. We discussed his results with [email or web address], and they agreed that we would try to do his programming.     Since then, he has seen Isabela Mendieta, who made some changes in May and he feels that his speech understanding is improving.      Past Medical History:  He has a past medical history of Arthritis, CHF (congestive heart failure), Chronic bronchitis (Multi), Coronary artery disease, Hearing aid worn, HL (hearing loss) (1955), Hyperlipidemia, Hypertension, Hypothyroidism, and Vision loss.    Surgical History:  He has a past surgical history that includes  Tonsillectomy (01/15/1965); Coronary artery bypass graft; Finger surgery; Cochlear implant; Cholecystectomy; and Foot surgery.     Social History:  He reports that he has never smoked. He has never used smokeless tobacco. He reports that he does not drink alcohol and does not use drugs.    Family History:  Family history is unknown by patient.     Medications:  Current Outpatient Medications   Medication Instructions    acetaminophen (TYLENOL) 650 mg, oral, Every 6 hours PRN    amLODIPine (Norvasc) 10 mg tablet     aspirin 81 mg, Daily    atorvastatin (Lipitor) 40 mg tablet     benazepril (Lotensin) 40 mg tablet     carvedilol (Coreg) 6.25 mg tablet     docusate sodium (COLACE) 100 mg, oral, 2 times daily PRN    ibuprofen 600 mg, oral, Every 6 hours PRN    levothyroxine (Synthroid, Levoxyl) 112 mcg tablet Take by mouth.    nitroglycerin (Nitrostat) 0.4 mg SL tablet     tamsulosin (Flomax) 0.4 mg 24 hr capsule       Allergies:  Patient has no known allergies.    Review of Systems:   A comprehensive 10-point review of systems was obtained including constitutional, neurological, HEENT, pulmonary, cardiovascular, genito-urinary, and other pertinent systems and was negative except as noted in the HPI.      Objective   Physical Exam:  Last Recorded Vitals: There were no vitals taken for this visit.    On physical exam, the patient is a well-nourished, well-developed patient, in no acute distress, able to communicate without assistance in English language. Head and face is atraumatic and normocephalic. Salivary glands are intact. Facial strength is symmetrical bilaterally.       On ear examination:  Right ear: The patient has an open and patent ear canal. The tympanic membrane is intact. The incision looks clear dry and intact. CI magnet in perfect position and site intact.  He cannot discern sound  Left ear: The patient has an open and patent ear canal. The tympanic membrane is intact with hemotympanum. The incision looks  clear dry and intact. CI magnet in perfect position and site intact.  AC>BC    On vestibular exam, the patient has no spontaneous nystagmus, no headshake nystagmus, no head-thrust nystagmus, and no nystagmus on hyperventilation or Valsalva maneuvers. Vici-Hallpike maneuver is negative bilaterally.       On neuro exam, the patient is alert and oriented x3, cranial nerves are grossly intact, cerebellar exam is normal.      The rest of the exam, including anterior rhinoscopy, oropharyngeal exam, neck exam, and cardiovascular exam, were normal including no palpable lymphadenopathies, thyroid in the midline position, normal pulses, and normal chest excursion.       Reviewed Results:  Audiology Testing:  I review his programming from May of 2025 that showed, on the right CNC of 0 and on the left, CNC of 16%.      I personally reviewed the CI evaluation from [facility] from  06/2024 that showed:            Right ear: 0% CNC in quiet, 0% AzBio in quiet.    Left ear: 28% CNC in quiet, 28% AzBio in quiet, 15% AzBio +10 SNR.                   Imaging:  I personally reviewed his CT IAC from 07/2024:  Patent cochlea bilaterally and normal positioning of the facial nerve     I personally reviewed his MRI IAC from 07/2024:  No retrocochlear pathology.      Procedure:  None    Assessment/Plan     1. Sensorineural hearing loss (SNHL) of both ears    2. Cochlear implant status      In summary, Giles Becerra is a 69 y.o. male with history of open heart surgery and severe bilateral sensory hearing loss that was worse in right compared to left. He met FDA criteria for bilateral  cochlear implantation.      He underwent right-sided cochlear implantation with a  electrode on 08/2024.  He had significant improvement in speech understanding.     His hearing on the left dropped further and for that reason I took the patient to the operating room on 12/27/24 and performed a left sequential cochlear implantation with a   electrode.    Since surgery he has been doing well. He recently was reprogrammed by Lenny Jackson. He feels that he's speech understanding is improving and he's scheduled to have a programming session in July.     I will see him back in 6 months.        ____________________________________________________  Trever Velazquez MD  Professor and Chief   Otology/Neurotology/Lateral Skull-Base Surgery   [facility]    Scribe Attestation  By signing my name below, I, Freya Dudley , Scribe   attest that this documentation has been prepared under the direction and in the presence of Trever Villegas MD.

## 2025-07-18 ENCOUNTER — APPOINTMENT (OUTPATIENT)
Dept: AUDIOLOGY | Facility: CLINIC | Age: 70
End: 2025-07-18
Payer: COMMERCIAL

## 2025-07-18 DIAGNOSIS — H90.3 SENSORINEURAL HEARING LOSS (SNHL) OF BOTH EARS: Primary | ICD-10-CM

## 2025-07-18 DIAGNOSIS — Z96.21 COCHLEAR IMPLANT STATUS: ICD-10-CM

## 2025-07-18 PROCEDURE — 92604 REPROGRAM COCHLEAR IMPLT 7/>: CPT | Performed by: AUDIOLOGIST

## 2025-07-18 PROCEDURE — 92626 EVAL AUD FUNCJ 1ST HOUR: CPT | Performed by: AUDIOLOGIST

## 2025-07-18 NOTE — PROGRESS NOTES
COCHLEAR IMPLANT (CI) PROGRAMMING and FUNCTIONAL TESTING         Name:  Giles Becerra  :  1955  Age:  69 y.o.  Date of Evaluation:  2025     RIGHT DEVICE  External Device: Cochlear [id number] ([id number]) sound processor  Internal Device: Cochlear [employer]    Surgeon: Trever Velazquez MD  Implantation Date: 2024  Activation Date: 2024 at [facility]    LEFT DEVICE  External Device: Cochlear [id number] ([id number]) sound processor  Internal Device: Cochlear [employer]    Surgeon: Trever Velazquez MD  Implantation Date: 2024  Activation Date: 2025 at [facility]    HISTORY  Giles Becerra arrives today for programming and optimization of the bilateral cochlear implants. The patient reports hearing many sounds with his cochlear implants and can hear sound at a distance that he couldn't before his cochlear implants. Feels environmental sounds are different and he is able to hear and understand more speech.    Recall, Giles Becerra has a documented history of profound sensorineural hearing loss, bilaterally. Recall, He was activated and managed at [facility] for the last year. He reports hearing loss since premature, low birth weight and NICU as a . He was unaided until he was 6 years old when he began wearing hearing aids. He uses spoken language to communicate but does use speech reading and knows American Sign Language.     RIGHT PROGRAMMING  Headset pressure, magnet strength (3I), and incision site were checked with no problems noted. Datalogging revealed 7+ hours of use per day with >1+ hours in speech, on average since the last appointment.    Electrode impedances, used to monitor internal device function, were measured across the electrode array.  Impedance measures were within normal limits for all electrodes, in each stimulation mode. Disabled channels 1 and 2 due at a previous appointment, remain disabled today. Programming began from MAP 33.  Current parameters of  MP1+2 stimulation mode, an ACE speech processing strategy, 8 maxima, PW 37, and a 900 Hz stimulation rate were maintained.  Threshold (T) levels were measuring using up-down bracketing method on a variety of channels with the remaining channels interpolated. No facial stimulation or non-auditory stimulation observed during live speech. Current programming consists of:  PROGRAM MAP SMARTSOUND WNR+SNR MICROPHONE VOLUME SENSITIVITY COMMENTS   1 MAP 35 SCAN2 Enabled Standard  Fixed  Adaptive 6 10 New program   2 MAP 35 ADRO+AutoS Enabled Fixed 6 8 Restaurant   3 MAP 35 ADRO+Whisper Enabled Standard 6 12 MUSIC - new program     LEFT PROGRAMMING  Headset pressure, magnet strength (3I), and incision site were checked with no problems noted. Datalogging revealed 14+ hours of use per day with 4 hours in speech, on average since the last appointment.    Electrode impedances, used to monitor internal device function, were measured across the electrode array.  Impedance measures were within normal limits for all electrodes, in each stimulation mode.  Disabled channels 1 and 2 due at a previous appointment, remain disabled today. Programming began from MAP 34.  Current parameters of MP1+2 stimulation mode, an ACE speech processing strategy, 8 maxima, PW 37, and a 900 Hz stimulation rate were maintained.  Threshold (T) levels were measuring using up-down bracketing method on a variety of channels with the remaining channels interpolated. Reduced Ts by 10 CLs on basal channels for comfort. No facial stimulation or non-auditory stimulation observed during live speech. Current programming consists of:  PROGRAM MAP SMARTSOUND WNR+SNR MICROPHONE VOLUME SENSITIVITY COMMENTS   1 MAP 36 SCAN2 Enabled Standard  Fixed  Adaptive 6 10 New program   2 MAP 36 ADRO+AutoS Enabled Fixed 6 8 Restaurant   3 MAP 36 ADRO+Whisper Enabled Standard 6 12 MUSIC - new program     FUNCTIONAL TESTING  Testing prior to programming at user settings  (P1/V6/S10)    All testing was completed in the soundfield at 0 degrees azimuth. Pure tone testing was obtained using pulsed frequency modulating (FM) stimuli. Sentence and word recognition testing was performed with recorded material at 60 dBSPL.    LISTENING CONDITION Aided thresholds 250-6000 Hz CNC Words  AzBio in quiet    Bilateral CI DNT DNT 40%   Right CI only 30-50 dB HL 20% DNT   Left CI only 25-55 dB HL 28% DNT     IMPRESSIONS  Positive stimulation on all active electrodes, bilaterally.  A reliable psychophysical MAP was defined in the ACE speech processing strategy, bilaterally. Functional testing revealed adequate detection with the right CI and adequate detection with the left CI with the exception of the high frequencies - this was address during programming. Improvement in word recognition as compared to last appointment, bilaterally.    Encouraged him to continue to do auditory rehabilitation, provided him with the AVT website.    RECOMMENDATIONS  1. Continue medical follow-up with your neuro-otologist (Dr. Schilling, Dr. Velazquez, or Dr. Solano)  2. Utilize the Cochlear [employer] Nucleus Cochlear [id number] (N8) sound processors daily using the most tolerated program.  3. Return in Jan 2026 to check programming, optimization, and functional testing.   4. Utilize aural rehabilitation/auditory training programs, books on tape/audiobooks/podcasts, and written materials at home to improve speech understanding ability.  5. Communication strategies were discussed (utilizing visual cues/gestures; reducing background noise and distance from desired source; increasing light to assist with visual cues; use of clear speech).  6. Contact [facility] CI Team auditory-verbal therapist to initiate aural rehabilitation therapy.    Melquiades Lopes  Clinical Audiologist     MELQUIADES Chang, CCC-A  Senior Clinical Audiologist    TIME: 100-230

## 2026-01-23 ENCOUNTER — APPOINTMENT (OUTPATIENT)
Dept: AUDIOLOGY | Facility: CLINIC | Age: 71
End: 2026-01-23
Payer: COMMERCIAL

## 2026-01-28 ENCOUNTER — APPOINTMENT (OUTPATIENT)
Dept: OTOLARYNGOLOGY | Facility: CLINIC | Age: 71
End: 2026-01-28
Payer: COMMERCIAL

## (undated) DEVICE — SUTURE, MONOCRYL, 4-0, 18 IN, PS2, UNDYED

## (undated) DEVICE — COVER, CART, 45 X 27 X 48 IN, CLEAR

## (undated) DEVICE — COMB, HAIR, 7 IN, PLASTIC, BLACK

## (undated) DEVICE — PROTECTOR, NERVE, ULNAR, PINK

## (undated) DEVICE — TAPE, SILK, DURAPORE, 3 IN X 10 YD, LF

## (undated) DEVICE — BAND, RUBBER, 3 IN, STERILE

## (undated) DEVICE — Device

## (undated) DEVICE — SYRINGE, MONOJECT, LUER LOCK, 3 CC, LF

## (undated) DEVICE — PAD, GROUNDING, ELECTROSURGICAL, W/15 FT CABLE, POLYHESIVE II, ADULT, LF

## (undated) DEVICE — SUTURE, VICRYL, 3-0,18 IN, SH, UNDYED

## (undated) DEVICE — BUR, COARSE DIAMOND, 3MM ROUND

## (undated) DEVICE — DRESSING, HYDROCOLLOID, TEGADERM, REG, 4X4

## (undated) DEVICE — CATHETER, IV, ANGIOCATH, 20 G X 1.88 IN, FEP POLYMER

## (undated) DEVICE — TUBING, SUCTION, OTOMED

## (undated) DEVICE — DRESSING, EAR, GLASSCOCK, ADULT

## (undated) DEVICE — BUR, COARSE DIAMOND, 2MM ROUND

## (undated) DEVICE — DRAPE, SURGICAL, OTOLOGY GLASSCOCK

## (undated) DEVICE — WAX, BONE, 2.5 GM

## (undated) DEVICE — BUR, CUTTER, 5MM, ROUND

## (undated) DEVICE — NEEDLE, HYPODERMIC, MONOJECT, TRI-BEVELED, ANTI-CORING, 25 G X 1.25 IN, LUER LOCK HUB, RED

## (undated) DEVICE — NEEDLE, HYPODERMIC, MONOJECT, 27 G X 1.5 IN

## (undated) DEVICE — STOCKINETTE, IMPERVIOUS, 12 X 48 IN, LF, STERILE

## (undated) DEVICE — SPONGE GAUZE, XRAY SC+RFID, 4X4 16 PLY, STERILE

## (undated) DEVICE — DRAPE, SMARTDRAPE, FOR TIVATO MICROSCOPE

## (undated) DEVICE — STRIP, SKIN CLOSURE, STERI STRIP, REINFORCED, 0.5 X 4 IN

## (undated) DEVICE — NEEDLE, HYPODERMIC, MONOJECT, TRI-BEVELED, ANTI-CORING, 27 G X 1.25 IN, LUER LOCK HUB, YELLOW

## (undated) DEVICE — SYRINGE, 1 CC, LUER LOCK

## (undated) DEVICE — BUR, FINE DIAMOND, 1MM, ROUND EXTENDED

## (undated) DEVICE — CLEANER, WIPE, INSTRUMENT, 3.25 X 3.25 IN

## (undated) DEVICE — CUP, SOLUTION

## (undated) DEVICE — MANIFOLD, 4 PORT NEPTUNE STANDARD

## (undated) DEVICE — REST, HEAD, BAGEL, 9 IN

## (undated) DEVICE — ELECTRODE, PAIRED SUBDERMAL OTO

## (undated) DEVICE — SPONGE, HEMOSTATIC, GELATIN, SURGIFOAM, 8 X 12.5 CM X 10 MM

## (undated) DEVICE — BUR, FINE DIAMOND, 1.5MM, ROUND EXTENDED

## (undated) DEVICE — NEEDLE, HYPODERMIC, 25 G X 1.5 IN, A BEVEL, STERILE